# Patient Record
Sex: MALE | Race: BLACK OR AFRICAN AMERICAN | NOT HISPANIC OR LATINO | Employment: OTHER | URBAN - METROPOLITAN AREA
[De-identification: names, ages, dates, MRNs, and addresses within clinical notes are randomized per-mention and may not be internally consistent; named-entity substitution may affect disease eponyms.]

---

## 2017-01-25 DIAGNOSIS — I10 ESSENTIAL HYPERTENSION: ICD-10-CM

## 2017-01-25 RX ORDER — PRASUGREL HCL 10 MG
TABLET ORAL
Qty: 90 TABLET | Refills: 0 | Status: SHIPPED | OUTPATIENT
Start: 2017-01-25 | End: 2017-02-03 | Stop reason: SDUPTHER

## 2017-01-25 RX ORDER — AMLODIPINE BESYLATE 2.5 MG/1
TABLET ORAL
Qty: 180 TABLET | Refills: 0 | Status: SHIPPED | OUTPATIENT
Start: 2017-01-25 | End: 2017-03-16 | Stop reason: SDUPTHER

## 2017-01-25 RX ORDER — METOPROLOL TARTRATE 50 MG/1
TABLET, FILM COATED ORAL
Qty: 180 TABLET | Refills: 0 | Status: SHIPPED | OUTPATIENT
Start: 2017-01-25 | End: 2017-04-23 | Stop reason: SDUPTHER

## 2017-01-25 RX ORDER — LISINOPRIL AND HYDROCHLOROTHIAZIDE 25; 20 MG/1; MG/1
TABLET ORAL
Qty: 90 TABLET | Refills: 0 | Status: SHIPPED | OUTPATIENT
Start: 2017-01-25 | End: 2017-02-03 | Stop reason: SDUPTHER

## 2017-02-03 DIAGNOSIS — I10 ESSENTIAL HYPERTENSION: ICD-10-CM

## 2017-02-03 RX ORDER — PRASUGREL HCL 10 MG
TABLET ORAL
Qty: 90 TABLET | Refills: 0 | Status: SHIPPED | OUTPATIENT
Start: 2017-02-03 | End: 2017-06-24 | Stop reason: SDUPTHER

## 2017-02-03 RX ORDER — LISINOPRIL AND HYDROCHLOROTHIAZIDE 25; 20 MG/1; MG/1
TABLET ORAL
Qty: 90 TABLET | Refills: 0 | Status: SHIPPED | OUTPATIENT
Start: 2017-02-03 | End: 2017-06-24 | Stop reason: SDUPTHER

## 2017-02-23 ENCOUNTER — OFFICE VISIT (OUTPATIENT)
Dept: CARDIOLOGY | Facility: CLINIC | Age: 47
End: 2017-02-23

## 2017-02-23 ENCOUNTER — HOSPITAL ENCOUNTER (OUTPATIENT)
Dept: GENERAL RADIOLOGY | Facility: HOSPITAL | Age: 47
Discharge: HOME | End: 2017-02-23
Attending: INTERNAL MEDICINE | Admitting: INTERNAL MEDICINE

## 2017-02-23 ENCOUNTER — TELEPHONE (OUTPATIENT)
Dept: CARDIOLOGY | Facility: CLINIC | Age: 47
End: 2017-02-23

## 2017-02-23 VITALS
SYSTOLIC BLOOD PRESSURE: 110 MMHG | HEART RATE: 52 BPM | BODY MASS INDEX: 33.46 KG/M2 | HEIGHT: 67 IN | WEIGHT: 213.2 LBS | DIASTOLIC BLOOD PRESSURE: 74 MMHG

## 2017-02-23 DIAGNOSIS — R00.2 PALPITATIONS: ICD-10-CM

## 2017-02-23 DIAGNOSIS — I25.10 CORONARY ARTERY DISEASE INVOLVING NATIVE CORONARY ARTERY OF NATIVE HEART WITHOUT ANGINA PECTORIS: ICD-10-CM

## 2017-02-23 DIAGNOSIS — R06.09 DYSPNEA ON EXERTION: ICD-10-CM

## 2017-02-23 DIAGNOSIS — I10 ESSENTIAL HYPERTENSION: Primary | ICD-10-CM

## 2017-02-23 DIAGNOSIS — R07.2 PRECORDIAL PAIN: ICD-10-CM

## 2017-02-23 PROCEDURE — 99214 OFFICE O/P EST MOD 30 MIN: CPT | Performed by: INTERNAL MEDICINE

## 2017-02-23 PROCEDURE — 93000 ELECTROCARDIOGRAM COMPLETE: CPT | Performed by: INTERNAL MEDICINE

## 2017-02-23 PROCEDURE — 71020 HC CHEST PA AND LATERAL: CPT

## 2017-02-26 PROBLEM — R06.09 DYSPNEA ON EXERTION: Status: ACTIVE | Noted: 2017-02-26

## 2017-02-26 PROBLEM — R00.2 PALPITATIONS: Status: ACTIVE | Noted: 2017-02-26

## 2017-03-09 ENCOUNTER — HOSPITAL ENCOUNTER (OUTPATIENT)
Dept: CARDIOLOGY | Facility: HOSPITAL | Age: 47
Discharge: HOME | End: 2017-03-09
Attending: INTERNAL MEDICINE | Admitting: INTERNAL MEDICINE

## 2017-03-09 VITALS
DIASTOLIC BLOOD PRESSURE: 88 MMHG | HEIGHT: 67 IN | WEIGHT: 213 LBS | HEART RATE: 89 BPM | BODY MASS INDEX: 33.43 KG/M2 | SYSTOLIC BLOOD PRESSURE: 128 MMHG

## 2017-03-09 DIAGNOSIS — R06.09 DYSPNEA ON EXERTION: ICD-10-CM

## 2017-03-09 DIAGNOSIS — R00.2 PALPITATIONS: ICD-10-CM

## 2017-03-09 DIAGNOSIS — R07.2 PRECORDIAL PAIN: ICD-10-CM

## 2017-03-09 LAB
BH CV ECHO MEAS - ACS: 2.1 CM
BH CV ECHO MEAS - AO MAX PG: 4.5 MMHG
BH CV ECHO MEAS - AO ROOT AREA (BSA CORRECTED): 1.6
BH CV ECHO MEAS - AO ROOT AREA: 8.3 CM^2
BH CV ECHO MEAS - AO ROOT DIAM: 3.2 CM
BH CV ECHO MEAS - AO V2 MAX: 105.7 CM/SEC
BH CV ECHO MEAS - BSA(HAYCOCK): 2.2 M^2
BH CV ECHO MEAS - BSA: 2.1 M^2
BH CV ECHO MEAS - BZI_BMI: 33.4 KILOGRAMS/M^2
BH CV ECHO MEAS - BZI_METRIC_HEIGHT: 170.2 CM
BH CV ECHO MEAS - BZI_METRIC_WEIGHT: 96.6 KG
BH CV ECHO MEAS - CONTRAST EF 4CH: 72.6 ML/M^2
BH CV ECHO MEAS - EDV(MOD-SP4): 73 ML
BH CV ECHO MEAS - EDV(TEICH): 138.1 ML
BH CV ECHO MEAS - EF(CUBED): 77.1 %
BH CV ECHO MEAS - EF(MOD-SP4): 60 %
BH CV ECHO MEAS - EF(TEICH): 68.7 %
BH CV ECHO MEAS - ESV(MOD-SP4): 20 ML
BH CV ECHO MEAS - ESV(TEICH): 43.3 ML
BH CV ECHO MEAS - FS: 38.8 %
BH CV ECHO MEAS - IVS/LVPW: 1
BH CV ECHO MEAS - IVSD: 1.2 CM
BH CV ECHO MEAS - LAT PEAK E' VEL: 10 CM/SEC
BH CV ECHO MEAS - LV DIASTOLIC VOL/BSA (35-75): 35.1 ML/M^2
BH CV ECHO MEAS - LV MASS(C)D: 246.1 GRAMS
BH CV ECHO MEAS - LV MASS(C)DI: 118.5 GRAMS/M^2
BH CV ECHO MEAS - LV SYSTOLIC VOL/BSA (12-30): 9.6 ML/M^2
BH CV ECHO MEAS - LVIDD: 5.3 CM
BH CV ECHO MEAS - LVIDS: 3.3 CM
BH CV ECHO MEAS - LVLD AP4: 6.8 CM
BH CV ECHO MEAS - LVLS AP4: 5.7 CM
BH CV ECHO MEAS - LVPWD: 1.2 CM
BH CV ECHO MEAS - MED PEAK E' VEL: 1 CM/SEC
BH CV ECHO MEAS - MV A DUR: 0.08 SEC
BH CV ECHO MEAS - MV A MAX VEL: 60.6 CM/SEC
BH CV ECHO MEAS - MV DEC SLOPE: 405.2 CM/SEC^2
BH CV ECHO MEAS - MV DEC TIME: 0.18 SEC
BH CV ECHO MEAS - MV E MAX VEL: 74.2 CM/SEC
BH CV ECHO MEAS - MV E/A: 1.2
BH CV ECHO MEAS - MV P1/2T MAX VEL: 75.2 CM/SEC
BH CV ECHO MEAS - MV P1/2T: 54.3 MSEC
BH CV ECHO MEAS - MVA P1/2T LCG: 2.9 CM^2
BH CV ECHO MEAS - MVA(P1/2T): 4 CM^2
BH CV ECHO MEAS - PULM A REVS DUR: 106 SEC
BH CV ECHO MEAS - PULM A REVS VEL: 27.7 CM/SEC
BH CV ECHO MEAS - PULM DIAS VEL: 39.1 CM/SEC
BH CV ECHO MEAS - PULM S/D: 1.5
BH CV ECHO MEAS - PULM SYS VEL: 56.9 CM/SEC
BH CV ECHO MEAS - SI(CUBED): 56.7 ML/M^2
BH CV ECHO MEAS - SI(MOD-SP4): 25.5 ML/M^2
BH CV ECHO MEAS - SI(TEICH): 45.7 ML/M^2
BH CV ECHO MEAS - SV(CUBED): 117.8 ML
BH CV ECHO MEAS - SV(MOD-SP4): 53 ML
BH CV ECHO MEAS - SV(TEICH): 94.9 ML
BH CV ECHO MEAS - TAPSE (>1.6): 2.4 CM2
BH CV STRESS BP STAGE 1: NORMAL
BH CV STRESS BP STAGE 2: NORMAL
BH CV STRESS BP STAGE 3: NORMAL
BH CV STRESS DURATION MIN STAGE 1: 3
BH CV STRESS DURATION MIN STAGE 2: 3
BH CV STRESS DURATION MIN STAGE 3: 3
BH CV STRESS DURATION MIN STAGE 4: 0
BH CV STRESS DURATION SEC STAGE 1: 0
BH CV STRESS DURATION SEC STAGE 2: 0
BH CV STRESS DURATION SEC STAGE 3: 0
BH CV STRESS DURATION SEC STAGE 4: 30
BH CV STRESS ECHO POST STRESS EJECTION FRACTION EF: 73 %
BH CV STRESS GRADE STAGE 1: 10
BH CV STRESS GRADE STAGE 2: 12
BH CV STRESS GRADE STAGE 3: 14
BH CV STRESS GRADE STAGE 4: 16
BH CV STRESS HR STAGE 1: 120
BH CV STRESS HR STAGE 2: 130
BH CV STRESS HR STAGE 3: 148
BH CV STRESS HR STAGE 4: 150
BH CV STRESS METS STAGE 1: 5
BH CV STRESS METS STAGE 2: 7.5
BH CV STRESS METS STAGE 3: 10
BH CV STRESS METS STAGE 4: 13.5
BH CV STRESS PROTOCOL 1: NORMAL
BH CV STRESS SPEED STAGE 1: 1.7
BH CV STRESS SPEED STAGE 2: 2.5
BH CV STRESS SPEED STAGE 3: 3.4
BH CV STRESS SPEED STAGE 4: 4.2
BH CV STRESS STAGE 1: 1
BH CV STRESS STAGE 2: 2
BH CV STRESS STAGE 3: 3
BH CV STRESS STAGE 4: 4
BH CV XLRA - RV BASE: 2.8 CM
BH CV XLRA - TDI S': 10 CM/SEC
E/E' RATIO: 8
LEFT ATRIUM VOLUME INDEX: 15 ML/M2
MAXIMAL PREDICTED HEART RATE: 172 BPM
PERCENT MAX PREDICTED HR: 87.21 %
STRESS BASELINE BP: NORMAL MMHG
STRESS BASELINE HR: 89 BPM
STRESS PERCENT HR: 103 %
STRESS POST ESTIMATED WORKLOAD: 13 METS
STRESS POST EXERCISE DUR MIN: 9 MIN
STRESS POST EXERCISE DUR SEC: 30 SEC
STRESS POST PEAK BP: NORMAL MMHG
STRESS POST PEAK HR: 150 BPM
STRESS TARGET HR: 146 BPM

## 2017-03-09 PROCEDURE — 93350 STRESS TTE ONLY: CPT

## 2017-03-09 PROCEDURE — 93320 DOPPLER ECHO COMPLETE: CPT | Performed by: INTERNAL MEDICINE

## 2017-03-09 PROCEDURE — 93018 CV STRESS TEST I&R ONLY: CPT | Performed by: INTERNAL MEDICINE

## 2017-03-09 PROCEDURE — 93017 CV STRESS TEST TRACING ONLY: CPT

## 2017-03-09 PROCEDURE — 93350 STRESS TTE ONLY: CPT | Performed by: INTERNAL MEDICINE

## 2017-03-09 PROCEDURE — 93320 DOPPLER ECHO COMPLETE: CPT

## 2017-03-09 PROCEDURE — 93016 CV STRESS TEST SUPVJ ONLY: CPT | Performed by: INTERNAL MEDICINE

## 2017-03-09 PROCEDURE — 93325 DOPPLER ECHO COLOR FLOW MAPG: CPT

## 2017-03-09 PROCEDURE — 93325 DOPPLER ECHO COLOR FLOW MAPG: CPT | Performed by: INTERNAL MEDICINE

## 2017-03-15 ENCOUNTER — TELEPHONE (OUTPATIENT)
Dept: CARDIOLOGY | Facility: CLINIC | Age: 47
End: 2017-03-15

## 2017-03-15 NOTE — TELEPHONE ENCOUNTER
I called patient at listed number to go over stress test and answer questions.  Got voicemail.  Left message to call back. aye

## 2017-03-16 DIAGNOSIS — I10 ESSENTIAL HYPERTENSION: ICD-10-CM

## 2017-03-16 RX ORDER — AMLODIPINE BESYLATE 2.5 MG/1
2.5 TABLET ORAL DAILY
Qty: 90 TABLET | Refills: 1 | Status: SHIPPED | OUTPATIENT
Start: 2017-03-16 | End: 2017-04-23 | Stop reason: SDUPTHER

## 2017-03-16 NOTE — TELEPHONE ENCOUNTER
I talked to patient regarding test results.  He is on a CPAP for the past 6-8 weeks.  There was some mention of possibly adjusting in the future by the sleep apnea physician  He is having nocturnal palpitations to 3 times a week that has awoken him from sleep and last a few seconds.  He will contact the sleep physicians regarding further titration of his CPAP.  If his symptoms persist despite this over this no further adjustments to be made, will place a 0 patch.      In addition patient states his had some urinary incontinence and there has been prescribed Flomax 0.4 mg at night.  I told him this is okay to start however will hold the p.m. dose of amlodipine for now.  He will follow his blood pressures and call if it is elevated. aye

## 2017-03-17 ENCOUNTER — TELEPHONE (OUTPATIENT)
Dept: CARDIOLOGY | Facility: CLINIC | Age: 47
End: 2017-03-17

## 2017-03-17 NOTE — TELEPHONE ENCOUNTER
Salima from [facility] in [facility] needs a faxed statement  stating pt is able to return to [facility] next week.   Sharon call back # [phone] /fax# [phone]   Thanks Iam suarez

## 2017-04-23 DIAGNOSIS — I10 ESSENTIAL HYPERTENSION: ICD-10-CM

## 2017-04-24 RX ORDER — METOPROLOL TARTRATE 50 MG/1
TABLET, FILM COATED ORAL
Qty: 180 TABLET | Refills: 2 | Status: SHIPPED | OUTPATIENT
Start: 2017-04-24 | End: 2019-08-16 | Stop reason: SDUPTHER

## 2017-04-24 RX ORDER — AMLODIPINE BESYLATE 2.5 MG/1
TABLET ORAL
Qty: 180 TABLET | Refills: 2 | Status: SHIPPED | OUTPATIENT
Start: 2017-04-24 | End: 2020-08-14 | Stop reason: SDUPTHER

## 2017-06-24 DIAGNOSIS — I10 ESSENTIAL HYPERTENSION: ICD-10-CM

## 2017-06-27 RX ORDER — LISINOPRIL AND HYDROCHLOROTHIAZIDE 25; 20 MG/1; MG/1
TABLET ORAL
Qty: 90 TABLET | Refills: 1 | Status: SHIPPED | OUTPATIENT
Start: 2017-06-27 | End: 2020-08-14 | Stop reason: SDUPTHER

## 2017-06-27 RX ORDER — PRASUGREL HCL 10 MG
TABLET ORAL
Qty: 90 TABLET | Refills: 1 | Status: SHIPPED | OUTPATIENT
Start: 2017-06-27 | End: 2018-01-15

## 2017-07-28 RX ORDER — ATORVASTATIN CALCIUM 80 MG/1
TABLET, FILM COATED ORAL
Qty: 90 TABLET | Refills: 2 | OUTPATIENT
Start: 2017-07-28

## 2017-08-24 ENCOUNTER — OFFICE VISIT (OUTPATIENT)
Dept: CARDIOLOGY | Facility: CLINIC | Age: 47
End: 2017-08-24

## 2017-08-24 VITALS
WEIGHT: 227 LBS | DIASTOLIC BLOOD PRESSURE: 90 MMHG | HEIGHT: 67 IN | SYSTOLIC BLOOD PRESSURE: 142 MMHG | BODY MASS INDEX: 35.63 KG/M2 | HEART RATE: 56 BPM

## 2017-08-24 DIAGNOSIS — I10 ESSENTIAL HYPERTENSION: ICD-10-CM

## 2017-08-24 DIAGNOSIS — E78.2 MIXED HYPERLIPIDEMIA: ICD-10-CM

## 2017-08-24 DIAGNOSIS — R07.2 PRECORDIAL PAIN: ICD-10-CM

## 2017-08-24 DIAGNOSIS — I25.10 CORONARY ARTERY DISEASE INVOLVING NATIVE CORONARY ARTERY OF NATIVE HEART WITHOUT ANGINA PECTORIS: Primary | ICD-10-CM

## 2017-08-24 DIAGNOSIS — R00.2 PALPITATIONS: ICD-10-CM

## 2017-08-24 PROCEDURE — 99214 OFFICE O/P EST MOD 30 MIN: CPT | Performed by: INTERNAL MEDICINE

## 2017-08-24 PROCEDURE — 93000 ELECTROCARDIOGRAM COMPLETE: CPT | Performed by: INTERNAL MEDICINE

## 2017-08-24 RX ORDER — SODIUM PHOSPHATE,MONO-DIBASIC 19G-7G/118
1 ENEMA (ML) RECTAL 2 TIMES DAILY WITH MEALS
COMMUNITY
End: 2020-01-31

## 2017-08-24 RX ORDER — CHLORAL HYDRATE 500 MG
1000 CAPSULE ORAL
COMMUNITY
End: 2021-07-15 | Stop reason: SDUPTHER

## 2017-08-24 NOTE — PROGRESS NOTES
Date of Office Visit: 2017  Encounter Provider: Christine Carson MD  Place of Service: [facility] CARDIOLOG[facility]  Patient Name: Matthew Morris  :1970    Chief complaint  Followup of  hypertension, coronary artery disease and evaluation of chest pain, palpitations    History of Present Illness   Patient is a very pleasant 47-year-old gentleman who was transferred from [facility] on  after chest pain with an elevated troponin consistent with non-ST elevation microinfarction.  Cardiogram revealed an ejection fraction 40% and a catheterization revealed severe disease of the right coronary artery.  He received a drug-coated stent.  He has had intermittent chest pain since then in the setting of hypertension.  He was admitted on  for such an occurrence he ruled out for myocardial infarction.  An echocardiogram revealed his ejection fraction had improved to 50% with mild to moderate mitral regurgitation.  In 2017 he had a stress echocardiogram that revealed no evidence of ischemia at a good workload with normal left ventricular systolic function with mild left ventricular hypertrophy and no significant valvar dysfunction  .  Since last visit blood pressure home has been occasional elevated with stress but most often is in the 130s over 80s range.  He has had more stresses with a loss of his sister who was in her 50s to stroke as well as 2 uncles.  He is to start back walking 3 miles and 45 minutes 3 weeks ago and denies any exertional symptoms.  However after eating only first lays down he will notice palpitations.  He has had chest discomfort when after eating as well.  He does not occur with exertion      Past Medical History:   Diagnosis Date   • Angina pectoris    • Aortic calcification    • Arthritis    • CAD (coronary artery disease)     NSTEMI 2016: cath with 99% RCA s/p 3.5x12mm WENDIE, 30% ostial LCx, normal LM/LAD   • Cervical radiculopathy    •  Chest pain    • Coronary artery disease involving native coronary artery of native heart without angina pectoris    • HERNÁNDEZ (dyspnea on exertion)    • Essential hypertension    • Headache    • Hyperlipidemia    • Hypertension    • Joint pain    • Joint swelling    • Knee pain    • Leg swelling    • Malaise and fatigue    • Moderate mitral regurgitation     by echo 4/2016   • MR (mitral regurgitation)    • Neck pain    • Palpitations    • Pinched nerve in neck 2016   • Precordial pain    • SOB (shortness of breath)      Past Surgical History:   Procedure Laterality Date   • CARDIAC CATHETERIZATION Left 4/28/2016    Procedure: Cardiac catheterization;  Surgeon: Linda Slaughter MD;  Location: [facility] CATH INVASIVE LOCATION;  Service:    • CARDIAC CATHETERIZATION N/A 4/28/2016    Procedure: Left ventriculography;  Surgeon: Linda Slaughter MD;  Location: [facility] CATH INVASIVE LOCATION;  Service:    • CORONARY ANGIOPLASTY WITH STENT PLACEMENT     • CYST REMOVAL  1997    right testicle, benign   • HERNIA REPAIR     • HYDROCELE EXCISION / REPAIR  1997     Outpatient Medications Prior to Visit   Medication Sig Dispense Refill   • amLODIPine (NORVASC) 2.5 MG tablet TAKE 1 TABLET EVERY 12 HOURS 180 tablet 2   • aspirin 81 MG EC tablet Take 81 mg by mouth daily.     • atorvastatin (LIPITOR) 80 MG tablet Take 1 tablet by mouth daily. 90 tablet 3   • EFFIENT 10 MG tablet TAKE 1 TABLET DAILY 90 tablet 1   • gabapentin (NEURONTIN) 600 MG tablet Take 600 mg by mouth 3 (three) times a day     • lisinopril-hydrochlorothiazide (PRINZIDE,ZESTORETIC) 20-25 MG per tablet TAKE 1 TABLET DAILY 90 tablet 1   • metoprolol tartrate (LOPRESSOR) 50 MG tablet TAKE 1 TABLET TWICE A  tablet 2     No facility-administered medications prior to visit.      Allergies as of 08/24/2017   • (No Known Allergies)     Social History     Social History   • Marital status: Single     Spouse name: N/A   • Number of children: N/A   • Years of education: N/A  "    Occupational History   •   [employer]     Social History Main Topics   • Smoking status: Never Smoker   • Smokeless tobacco: Not on file   • Alcohol use 0.0 oz/week     0 Glasses of wine per week      Comment: occ   • Drug use: No   • Sexual activity: Defer     Other Topics Concern   • Not on file     Social History Narrative     Family History   Problem Relation Age of Onset   • Heart disease Mother    • Heart disease Father      Review of Systems   Constitution: Positive for malaise/fatigue. Negative for fever, weight gain and weight loss.   HENT: Positive for headaches. Negative for ear pain, hearing loss, nosebleeds and sore throat.    Eyes: Negative for double vision, pain, vision loss in left eye and vision loss in right eye.   Cardiovascular: Positive for palpitations.        See history of present illness.   Respiratory: Negative for cough, shortness of breath, sleep disturbances due to breathing, snoring and wheezing.    Endocrine: Negative for cold intolerance, heat intolerance and polyuria.   Skin: Negative for itching, poor wound healing and rash.   Musculoskeletal: Negative for joint pain, joint swelling and myalgias.   Gastrointestinal: Negative for abdominal pain, diarrhea, hematochezia, nausea and vomiting.   Genitourinary: Negative for hematuria and hesitancy.   Neurological: Negative for numbness, paresthesias and seizures.   Psychiatric/Behavioral: Negative for depression. The patient is not nervous/anxious.      Objective:     Vitals:    08/24/17 1009   BP: 142/90   Pulse: 56   Weight: 227 lb (103 kg)   Height: 67\" (170.2 cm)     Body mass index is 35.55 kg/(m^2).    Physical Exam   Constitutional: He is oriented to person, place, and time. He appears well-developed and well-nourished.   Obese   HENT:   Head: Normocephalic.   Nose: Nose normal.   Mouth/Throat: Oropharynx is clear and moist.   Eyes: Conjunctivae and EOM are normal. Pupils are equal, round, and reactive to light. Right eye exhibits " no discharge. No scleral icterus.   Neck: Normal range of motion. Neck supple. No JVD present. No thyromegaly present.   Cardiovascular: Normal rate, regular rhythm, normal heart sounds and intact distal pulses.  Exam reveals no gallop and no friction rub.    No murmur heard.  Pulses:       Carotid pulses are 2+ on the right side, and 2+ on the left side.       Radial pulses are 2+ on the right side, and 2+ on the left side.        Femoral pulses are 2+ on the right side, and 2+ on the left side.       Popliteal pulses are 2+ on the right side, and 2+ on the left side.        Dorsalis pedis pulses are 2+ on the right side, and 2+ on the left side.        Posterior tibial pulses are 2+ on the right side, and 2+ on the left side.   Pulmonary/Chest: Effort normal and breath sounds normal. No respiratory distress. He has no wheezes. He has no rales.   Abdominal: Soft. Bowel sounds are normal. He exhibits no distension. There is no hepatosplenomegaly. There is no tenderness. There is no rebound.   Musculoskeletal: Normal range of motion. He exhibits no edema or tenderness.   Neurological: He is alert and oriented to person, place, and time.   Skin: Skin is warm and dry. No rash noted. No erythema.   Psychiatric: He has a normal mood and affect. His behavior is normal. Judgment and thought content normal.   Vitals reviewed.    Lab Review:     ECG 12 Lead  Date/Time: 8/24/2017 10:12 AM  Performed by: PACO GOMEZ  Authorized by: PACO GOMEZ   Comparison: compared with previous ECG   Similar to previous ECG  Rhythm: sinus rhythm  Conduction comments: Nonspecific ST T wave changes  QTc = 394msec  Clinical impression: abnormal ECG          Assessment:       Diagnosis Plan   1. Coronary artery disease involving native coronary artery of native heart without angina pectoris     2. Palpitations  ZIO Patch   3. Essential hypertension     4. Mixed hyperlipidemia     5. Precordial pain       Plan:       1   Episodes of palpitations.   We'll place a 0 patch.  2.  Coronary artery disease, non-ST elevation myocardial infarction, status post drug-coated stent placement.  No anginal symptoms at this time with a negative stress test earlier this year  3.  Hypertension, observe for now on his current regimen as he is just started back exercising.  May need further titration if it remains elevated  4.  Mitral regurgitation, now mild  5.  Hyperlipidemia, told was acceptable on recent labs  6.  Chest pain.  This sounds more gastric in etiology will discuss further with Dr. Ruiz.  7.  Obstructive sleep apnea.  He is using his CPAP consistently though has gained 14 pounds.  He will follow-up with his sleep physicians as he may need further titration of this as well    Coronary Artery Disease  Assessment  • The patient has no angina    Plan  • Lifestyle modifications discussed include adhering to a heart healthy diet, maintenance of a healthy weight, regular exercise and regular monitoring of cholesterol and blood pressure    Subjective - Objective  • There is a history of past MI  • There has been a previous stent procedure using WENDIE  • Current antiplatelet therapy includes aspirin 81 mg       Matthew Morris   Home Medication Instructions AARON:    Printed on:08/27/17 1015   Medication Information                      amLODIPine (NORVASC) 2.5 MG tablet  TAKE 1 TABLET EVERY 12 HOURS             aspirin 81 MG EC tablet  Take 81 mg by mouth daily.             atorvastatin (LIPITOR) 80 MG tablet  Take 1 tablet by mouth daily.             EFFIENT 10 MG tablet  TAKE 1 TABLET DAILY             gabapentin (NEURONTIN) 600 MG tablet  Take 600 mg by mouth 3 (three) times a day             glucosamine-chondroitin 500-400 MG capsule capsule  Take 1 capsule by mouth 3 (Three) Times a Day With Meals.             lisinopril-hydrochlorothiazide (PRINZIDE,ZESTORETIC) 20-25 MG per tablet  TAKE 1 TABLET DAILY             metoprolol tartrate (LOPRESSOR) 50 MG tablet  TAKE 1  TABLET TWICE A DAY             Omega-3 Fatty Acids (FISH OIL) 1000 MG capsule capsule  Take 1,000 mg by mouth Daily With Breakfast.             VOLTAREN 1 % gel gel  As Needed.                 Dictated utilizing Dragon dictation

## 2018-01-11 ENCOUNTER — TELEPHONE (OUTPATIENT)
Dept: CARDIOLOGY | Facility: CLINIC | Age: 48
End: 2018-01-11

## 2018-01-11 NOTE — TELEPHONE ENCOUNTER
----- Message from Iam Mancera MA sent at 1/11/2018  8:56 AM EST -----  Regarding: FW: Prescription Question  Contact: [phone]      ----- Message -----     From: Sen Morris     Sent: 1/9/2018  10:19 AM       To: Kristin Beauchamp [facility]  Subject: Prescription Question                            good morning,  Dr GILLILAND and team,    Is there any medcine alternative to effient that can be prescribed for me.  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     just looking for options as i move forward. i have a 60 supply, but want to inform you.     thanks in advance  sen

## 2018-01-15 RX ORDER — CLOPIDOGREL BISULFATE 75 MG/1
75 TABLET ORAL DAILY
Qty: 90 TABLET | Refills: 1 | Status: SHIPPED | OUTPATIENT
Start: 2018-01-15 | End: 2019-01-08 | Stop reason: SDUPTHER

## 2018-01-15 NOTE — TELEPHONE ENCOUNTER
Info to pt.  No reaction to the Plavix.  Rx sent to [facility].  Pt has about 60 days of Effient left before he switches.  (done)

## 2018-04-12 ENCOUNTER — OFFICE VISIT (OUTPATIENT)
Dept: CARDIOLOGY | Facility: CLINIC | Age: 48
End: 2018-04-12

## 2018-04-12 VITALS
BODY MASS INDEX: 36.41 KG/M2 | SYSTOLIC BLOOD PRESSURE: 130 MMHG | DIASTOLIC BLOOD PRESSURE: 92 MMHG | HEART RATE: 58 BPM | WEIGHT: 232 LBS | HEIGHT: 67 IN

## 2018-04-12 DIAGNOSIS — I25.10 CORONARY ARTERY DISEASE INVOLVING NATIVE CORONARY ARTERY OF NATIVE HEART WITHOUT ANGINA PECTORIS: ICD-10-CM

## 2018-04-12 DIAGNOSIS — R00.2 PALPITATIONS: ICD-10-CM

## 2018-04-12 DIAGNOSIS — E78.2 MIXED HYPERLIPIDEMIA: ICD-10-CM

## 2018-04-12 DIAGNOSIS — I10 ESSENTIAL HYPERTENSION: Primary | ICD-10-CM

## 2018-04-12 PROCEDURE — 93000 ELECTROCARDIOGRAM COMPLETE: CPT | Performed by: NURSE PRACTITIONER

## 2018-04-12 PROCEDURE — 99214 OFFICE O/P EST MOD 30 MIN: CPT | Performed by: NURSE PRACTITIONER

## 2018-04-12 NOTE — PROGRESS NOTES
Date of Office Visit: 2018  Encounter Provider: CELI Campo  Place of Service: [facility] CARDIOLOGY  Patient Name: Matthew Morris  :1970    No chief complaint on file.  : chest pain    HPI: Matthew Morris is a 47 y.o. male comes in today for Six-month followup. He is a patient of Dr. Carson. I am seeing him for the 1st time today and have reviewed his records. He has a history of coronary disease, palpitations, hypertension, mild mitral regurgitation, hyperlipidemia and sleep apnea.     In 2016, he was transferred from [facility] with chest pain and an elevated troponin consistent with a non-ST elevation MI. His echo showed an EF of 40%. On cardiac cath he was found to have severe disease of the RCA. A drug-eluting stent was placed. He had intermittent chest pain in the setting of hypertension. A repeat echo in 2016 showed an EF of 50% with mild-to-moderate MR.     In 2017, he had a stress echo revealing no evidence of ischemia and good workload with normal LV systolic function with mild LVH and no significant valvular disease.    Palpitations that last 5-10seconds with a sharp pain. Has had only 3 in last 6 months. Otherwise feeling well. Denies palpitations or tachycardia, shortness of breath, edema, dizziness, chest pain, fatigue, orthopnea or PND. Exercises regularly. He will get lab work    Past Medical History:   Diagnosis Date   • Angina pectoris    • Aortic calcification    • Arthritis    • CAD (coronary artery disease)     NSTEMI 2016: cath with 99% RCA s/p 3.5x12mm WENDIE, 30% ostial LCx, normal LM/LAD   • Cervical radiculopathy    • Chest pain    • Coronary artery disease involving native coronary artery of native heart without angina pectoris    • HERNÁNDEZ (dyspnea on exertion)    • Essential hypertension    • Headache    • Hyperlipidemia    • Hypertension    • Joint pain    • Joint swelling    • Knee pain    • Leg swelling    •  Malaise and fatigue    • Moderate mitral regurgitation     by echo 4/2016   • MR (mitral regurgitation)    • Neck pain    • Palpitations    • Pinched nerve in neck 2016   • Precordial pain    • SOB (shortness of breath)        Past Surgical History:   Procedure Laterality Date   • CARDIAC CATHETERIZATION Left 4/28/2016    Procedure: Cardiac catheterization;  Surgeon: Linda Slaughter MD;  Location:  [facility] CATH INVASIVE LOCATION;  Service:    • CARDIAC CATHETERIZATION N/A 4/28/2016    Procedure: Left ventriculography;  Surgeon: Linda Slaughter MD;  Location: [facility] CATH INVASIVE LOCATION;  Service:    • CORONARY ANGIOPLASTY WITH STENT PLACEMENT     • CYST REMOVAL  1997    right testicle, benign   • HERNIA REPAIR     • HYDROCELE EXCISION / REPAIR  1997           Review of Systems   Constitution: Positive for malaise/fatigue. Negative for fever, weight gain and weight loss.   HENT: Negative for ear pain, hearing loss, nosebleeds and sore throat.    Eyes: Negative for double vision, pain, vision loss in left eye and vision loss in right eye.   Cardiovascular: Positive for palpitations.        See history of present illness.   Respiratory: Negative for cough, shortness of breath, sleep disturbances due to breathing, snoring and wheezing.    Endocrine: Negative for cold intolerance, heat intolerance and polyuria.   Skin: Negative for itching, poor wound healing and rash.   Musculoskeletal: Negative for joint pain, joint swelling and myalgias.   Gastrointestinal: Negative for abdominal pain, diarrhea, hematochezia, nausea and vomiting.   Genitourinary: Negative for hematuria and hesitancy.   Neurological: Positive for headaches. Negative for numbness, paresthesias and seizures.   Psychiatric/Behavioral: Negative for depression. The patient is not nervous/anxious.      All other systems reviewed and are negative    No Known Allergies    All aspects of family and social history reviewed.           Objective:     Vitals:     "04/12/18 1524   BP: 130/92   BP Location: Left arm   Pulse: 58   Weight: 105 kg (232 lb)   Height: 170.2 cm (67\")     Body mass index is 36.34 kg/m².    PHYSICAL EXAM:  Physical Exam   Constitutional: He is oriented to person, place, and time. He appears well-developed and well-nourished.   Obese   HENT:   Head: Normocephalic.   Nose: Nose normal.   Mouth/Throat: Oropharynx is clear and moist.   Eyes: Conjunctivae and EOM are normal. Pupils are equal, round, and reactive to light. Right eye exhibits no discharge. No scleral icterus.   Neck: Normal range of motion. Neck supple. No JVD present. No thyromegaly present.   Cardiovascular: Normal rate, regular rhythm, normal heart sounds and intact distal pulses.  Exam reveals no gallop and no friction rub.    No murmur heard.  Pulses:       Carotid pulses are 2+ on the right side, and 2+ on the left side.       Radial pulses are 2+ on the right side, and 2+ on the left side.        Femoral pulses are 2+ on the right side, and 2+ on the left side.       Popliteal pulses are 2+ on the right side, and 2+ on the left side.        Dorsalis pedis pulses are 2+ on the right side, and 2+ on the left side.        Posterior tibial pulses are 2+ on the right side, and 2+ on the left side.   Pulmonary/Chest: Effort normal and breath sounds normal. No respiratory distress. He has no wheezes. He has no rales.   Abdominal: Soft. Bowel sounds are normal. He exhibits no distension. There is no hepatosplenomegaly. There is no tenderness. There is no rebound.   Musculoskeletal: Normal range of motion. He exhibits no edema or tenderness.   Neurological: He is alert and oriented to person, place, and time.   Skin: Skin is warm and dry. No rash noted. No erythema.   Psychiatric: He has a normal mood and affect. His behavior is normal. Judgment and thought content normal.   Vitals reviewed.        ECG 12 Lead  Date/Time: 4/12/2018 8:29 AM  Performed by: MATTHEW CASTANO  Authorized by: " MATTHEW CASTANO   Comparison: compared with previous ECG from 8/24/2017  Rhythm: sinus rhythm  Rate: normal  BPM: 58  Conduction: conduction normal  ST Segments: ST segments normal  T Waves: T waves normal  QRS axis: normal  Other: no other findings  Clinical impression: normal ECG  Comments: Indication: palpitations                Assessment:       Diagnosis Plan   1. Essential hypertension     2. Coronary artery disease involving native coronary artery of native heart without angina pectoris     3. Mixed hyperlipidemia     4. Palpitations          Orders Placed This Encounter   Procedures   • ECG 12 Lead     This order was created via procedure documentation       Current Outpatient Prescriptions   Medication Sig Dispense Refill   • amLODIPine (NORVASC) 2.5 MG tablet TAKE 1 TABLET EVERY 12 HOURS 180 tablet 2   • aspirin 81 MG EC tablet Take 81 mg by mouth daily.     • atorvastatin (LIPITOR) 80 MG tablet Take 1 tablet by mouth daily. 90 tablet 3   • clopidogrel (PLAVIX) 75 MG tablet Take 1 tablet by mouth Daily. 90 tablet 1   • gabapentin (NEURONTIN) 600 MG tablet Take 600 mg by mouth 3 (three) times a day     • glucosamine-chondroitin 500-400 MG capsule capsule Take 1 capsule by mouth 2 (Two) Times a Day With Meals.     • lisinopril-hydrochlorothiazide (PRINZIDE,ZESTORETIC) 20-25 MG per tablet TAKE 1 TABLET DAILY 90 tablet 1   • metoprolol tartrate (LOPRESSOR) 50 MG tablet TAKE 1 TABLET TWICE A  tablet 2   • Omega-3 Fatty Acids (FISH OIL) 1000 MG capsule capsule Take 1,000 mg by mouth Daily With Breakfast.     • VOLTAREN 1 % gel gel As Needed.       No current facility-administered medications for this visit.             Plan:       1. Coronary coronary disease-drug-eluting stent to the RCA in 2016.  Non-ST elevation MI.  EF reduced to 40% at time of event.  Last echo showed an EF of 60%.    Coronary Artery Disease  Assessment  • The patient has no angina    Plan  • Lifestyle modifications discussed  include adhering to a heart healthy diet, maintenance of a healthy weight, regular exercise and regular monitoring of cholesterol and blood pressure    Subjective - Objective  • There is a history of past MI  • There has been a previous stent procedure using WENDIE  • Current antiplatelet therapy includes aspirin 81 mg      Denies angina      2.  Palpitations-patient was having palpitations that last visit.  Normal Zio patch. Last 5 seconds at the most. Infrequent. Continue to monitor    3.  Hypertension-stable    4.  Hyperlipidemia-on atorvastatin    5.  Sleep apnea      No changes. Lab work to be faxed from [place]. Lipid panel. Not sure if has been drawn in last 6 months    Follow up in office in 1 year.     As always, it has been a pleasure to participate in this patient's care.      Sincerely,      Dinora Thomas, APRN

## 2019-01-03 ENCOUNTER — OFFICE VISIT (OUTPATIENT)
Dept: CARDIOLOGY | Facility: CLINIC | Age: 49
End: 2019-01-03

## 2019-01-03 VITALS
BODY MASS INDEX: 36.22 KG/M2 | SYSTOLIC BLOOD PRESSURE: 130 MMHG | HEART RATE: 55 BPM | WEIGHT: 230.8 LBS | HEIGHT: 67 IN | DIASTOLIC BLOOD PRESSURE: 80 MMHG

## 2019-01-03 DIAGNOSIS — R07.2 PRECORDIAL PAIN: Primary | ICD-10-CM

## 2019-01-03 DIAGNOSIS — I10 ESSENTIAL HYPERTENSION: ICD-10-CM

## 2019-01-03 DIAGNOSIS — I25.10 CORONARY ARTERY DISEASE INVOLVING NATIVE CORONARY ARTERY OF NATIVE HEART WITHOUT ANGINA PECTORIS: ICD-10-CM

## 2019-01-03 PROCEDURE — 99213 OFFICE O/P EST LOW 20 MIN: CPT | Performed by: INTERNAL MEDICINE

## 2019-01-03 PROCEDURE — 93000 ELECTROCARDIOGRAM COMPLETE: CPT | Performed by: INTERNAL MEDICINE

## 2019-01-03 NOTE — PROGRESS NOTES
Date of Office Visit: 2019  Encounter Provider: Christine Carson MD  Place of Service: [facility] CARDIOLOGY  Patient Name: Matthew Morris  :1970    Chief complaint  Followup of  hypertension, coronary artery disease and evaluation of chest pain, palpitations    History of Present Illness   Patient is a very pleasant 48-year-old gentleman who was transferred from [facility] on 2016 after chest pain with an elevated troponin consistent with non-ST elevation myocardial infarction.  Cardiogram revealed an ejection fraction 40% and a catheterization revealed severe disease of the right coronary artery.  He received a drug-coated stent.  He has had intermittent chest pain since then in the setting of hypertension.  He was admitted on  for such an occurrence he ruled out for myocardial infarction.  An echocardiogram revealed his ejection fraction had improved to 50% with mild to moderate mitral regurgitation.  In 2017 he had a stress echocardiogram that revealed no evidence of ischemia at a good workload with normal left ventricular systolic function with mild left ventricular hypertrophy and no significant valvar dysfunction.  2017 he complained of brief palpitations and chest pain that was atypical.  Ziopatch showed no arrhythmia and the presence of    Last visit blood pressures been as it is today.  He is walking 2 miles and 45 minutes 3 times a week.  He has had palpitations that occur once or twice a month for a few seconds.  He does not have dyspnea.  He has mild dependent edema.  He has had episodes of chest discomfort that is unchanged and chronic.  And typically occurs after meals.  Also can occur when lying down.  He has been seen by gastroenterology but did not have an EGD.    Past Medical History:   Diagnosis Date   • Angina pectoris (CMS/HCC)    • Aortic calcification (CMS/HCC)    • Arthritis    • CAD (coronary artery disease)      NSTEMI 4/2016: cath with 99% RCA s/p 3.5x12mm WENDIE, 30% ostial LCx, normal LM/LAD   • Cervical radiculopathy    • Chest pain    • Coronary artery disease involving native coronary artery of native heart without angina pectoris    • HERNÁNDEZ (dyspnea on exertion)    • Essential hypertension    • Headache    • Hyperlipidemia    • Hypertension    • Joint pain    • Joint swelling    • Knee pain    • Leg swelling    • Malaise and fatigue    • Moderate mitral regurgitation     by echo 4/2016   • MR (mitral regurgitation)    • Neck pain    • Palpitations    • Pinched nerve in neck 2016   • Precordial pain    • SOB (shortness of breath)      Past Surgical History:   Procedure Laterality Date   • CARDIAC CATHETERIZATION Left 4/28/2016    Procedure: Cardiac catheterization;  Surgeon: Linda Slaughter MD;  Location: [facility] CATH INVASIVE LOCATION;  Service:    • CARDIAC CATHETERIZATION N/A 4/28/2016    Procedure: Left ventriculography;  Surgeon: Linda Slaughter MD;  Location: [facility] CATH INVASIVE LOCATION;  Service:    • CORONARY ANGIOPLASTY WITH STENT PLACEMENT     • CYST REMOVAL  1997    right testicle, benign   • HERNIA REPAIR     • HYDROCELE EXCISION / REPAIR  1997     Outpatient Medications Prior to Visit   Medication Sig Dispense Refill   • amLODIPine (NORVASC) 2.5 MG tablet TAKE 1 TABLET EVERY 12 HOURS 180 tablet 2   • aspirin 81 MG EC tablet Take 81 mg by mouth daily.     • atorvastatin (LIPITOR) 80 MG tablet Take 1 tablet by mouth daily. 90 tablet 3   • gabapentin (NEURONTIN) 600 MG tablet Take 600 mg by mouth 3 (three) times a day     • glucosamine-chondroitin 500-400 MG capsule capsule Take 1 capsule by mouth 2 (Two) Times a Day With Meals.     • lisinopril-hydrochlorothiazide (PRINZIDE,ZESTORETIC) 20-25 MG per tablet TAKE 1 TABLET DAILY 90 tablet 1   • metoprolol tartrate (LOPRESSOR) 50 MG tablet TAKE 1 TABLET TWICE A  tablet 2   • Omega-3 Fatty Acids (FISH OIL) 1000 MG capsule capsule Take 1,000 mg by mouth  Daily With Breakfast.     • VOLTAREN 1 % gel gel As Needed.     • clopidogrel (PLAVIX) 75 MG tablet Take 1 tablet by mouth Daily. 90 tablet 1     No facility-administered medications prior to visit.        Allergies as of 01/03/2019   • (No Known Allergies)     Social History     Socioeconomic History   • Marital status: Single     Spouse name: Not on file   • Number of children: Not on file   • Years of education: Not on file   • Highest education level: Not on file   Social Needs   • Financial resource strain: Not on file   • Food insecurity - worry: Not on file   • Food insecurity - inability: Not on file   • Transportation needs - medical: Not on file   • Transportation needs - non-medical: Not on file   Occupational History     Employer: [employer]   Tobacco Use   • Smoking status: Never Smoker   • Smokeless tobacco: Never Used   Substance and Sexual Activity   • Alcohol use: No     Alcohol/week: 0.0 oz     Frequency: Never     Comment: Daily caffeine use   • Drug use: No   • Sexual activity: Defer   Other Topics Concern   • Not on file   Social History Narrative   • Not on file     Family History   Problem Relation Age of Onset   • Heart disease Mother    • Heart disease Father      Review of Systems   Constitution: Positive for malaise/fatigue. Negative for fever, weight gain and weight loss.   HENT: Negative for ear pain, hearing loss, nosebleeds and sore throat.    Eyes: Negative for double vision, pain, vision loss in left eye and vision loss in right eye.   Cardiovascular:        See history of present illness.   Respiratory: Negative for cough, shortness of breath, sleep disturbances due to breathing, snoring and wheezing.    Endocrine: Negative for cold intolerance, heat intolerance and polyuria.   Skin: Negative for itching, poor wound healing and rash.   Musculoskeletal: Positive for joint pain, joint swelling and myalgias.   Gastrointestinal: Negative for abdominal pain, diarrhea, hematochezia, nausea  "and vomiting.   Genitourinary: Negative for hematuria and hesitancy.   Neurological: Positive for paresthesias. Negative for numbness and seizures.   Psychiatric/Behavioral: Negative for depression. The patient is not nervous/anxious.         Objective:     Vitals:    01/03/19 0941   BP: 130/80   Pulse: 55   Weight: 105 kg (230 lb 12.8 oz)   Height: 170.2 cm (67\")     Body mass index is 36.15 kg/m².    Physical Exam   Constitutional: He is oriented to person, place, and time. He appears well-developed and well-nourished.   Obese   HENT:   Head: Normocephalic.   Nose: Nose normal.   Mouth/Throat: Oropharynx is clear and moist.   Eyes: Conjunctivae and EOM are normal. Pupils are equal, round, and reactive to light. Right eye exhibits no discharge. No scleral icterus.   Neck: Normal range of motion. Neck supple. No JVD present. No thyromegaly present.   Cardiovascular: Normal rate, regular rhythm, normal heart sounds and intact distal pulses. Exam reveals no gallop and no friction rub.   No murmur heard.  Pulses:       Carotid pulses are 2+ on the right side, and 2+ on the left side.       Radial pulses are 2+ on the right side, and 2+ on the left side.        Femoral pulses are 2+ on the right side, and 2+ on the left side.       Popliteal pulses are 2+ on the right side, and 2+ on the left side.        Dorsalis pedis pulses are 2+ on the right side, and 2+ on the left side.        Posterior tibial pulses are 2+ on the right side, and 2+ on the left side.   Pulmonary/Chest: Effort normal and breath sounds normal. No respiratory distress. He has no wheezes. He has no rales.   Abdominal: Soft. Bowel sounds are normal. He exhibits no distension. There is no hepatosplenomegaly. There is no tenderness. There is no rebound.   Musculoskeletal: Normal range of motion. He exhibits no edema or tenderness.   Neurological: He is alert and oriented to person, place, and time.   Skin: Skin is warm and dry. No rash noted. No " erythema.   Psychiatric: He has a normal mood and affect. His behavior is normal. Judgment and thought content normal.   Vitals reviewed.    Lab Review:     ECG 12 Lead  Date/Time: 1/3/2019 9:41 AM  Performed by: Christine Carson MD  Authorized by: Christine Carson MD   Comparison: compared with previous ECG   Similar to previous ECG  Rhythm: sinus rhythm  Clinical impression: normal ECG          Assessment:       Diagnosis Plan   1. Precordial pain  ECG 12 Lead   2. Coronary artery disease involving native coronary artery of native heart without angina pectoris     3. Essential hypertension       Plan:       1   Episodes of palpitations.  On echo and infrequent.  2.  Coronary artery disease, non-ST elevation myocardial infarction, status post drug-coated stent placement.  No anginal symptoms at this time with a negative stress test earlier this year  3.  Hypertension, he will call for remains greater than 130/80.  4.  Mitral regurgitation, now mild  5.  Hyperlipidemia, told was acceptable on recent labs  6.  Chest pain.  He will discuss possible EGD with his primary care physician Dr. Blood.  7.  Obstructive sleep apnea.  On CPAP therapy.           Your medication list           Accurate as of 1/3/19 11:59 PM. If you have any questions, ask your nurse or doctor.               CONTINUE taking these medications      Instructions Last Dose Given Next Dose Due   amLODIPine 2.5 MG tablet  Commonly known as:  NORVASC      TAKE 1 TABLET EVERY 12 HOURS       aspirin 81 MG EC tablet      Take 81 mg by mouth daily.       atorvastatin 80 MG tablet  Commonly known as:  LIPITOR      Take 1 tablet by mouth daily.       clopidogrel 75 MG tablet  Commonly known as:  PLAVIX      Take 1 tablet by mouth Daily.       fish oil 1000 MG capsule capsule      Take 1,000 mg by mouth Daily With Breakfast.       gabapentin 600 MG tablet  Commonly known as:  NEURONTIN      Take 600 mg by mouth 3 (three) times a day       glucosamine-chondroitin 500-400  MG capsule capsule      Take 1 capsule by mouth 2 (Two) Times a Day With Meals.       lisinopril-hydrochlorothiazide 20-25 MG per tablet  Commonly known as:  PRINZIDE,ZESTORETIC      TAKE 1 TABLET DAILY       metoprolol tartrate 50 MG tablet  Commonly known as:  LOPRESSOR      TAKE 1 TABLET TWICE A DAY       VOLTAREN 1 % gel gel  Generic drug:  diclofenac      As Needed.            Dictated utilizing Dragon dictation

## 2019-01-08 RX ORDER — CLOPIDOGREL BISULFATE 75 MG/1
75 TABLET ORAL DAILY
Qty: 90 TABLET | Refills: 1 | Status: SHIPPED | OUTPATIENT
Start: 2019-01-08 | End: 2019-06-24 | Stop reason: SDUPTHER

## 2019-02-22 ENCOUNTER — TELEPHONE (OUTPATIENT)
Dept: CARDIOLOGY | Facility: CLINIC | Age: 49
End: 2019-02-22

## 2019-06-24 ENCOUNTER — OFFICE VISIT (OUTPATIENT)
Dept: CARDIOLOGY | Facility: CLINIC | Age: 49
End: 2019-06-24

## 2019-06-24 VITALS
BODY MASS INDEX: 36.26 KG/M2 | SYSTOLIC BLOOD PRESSURE: 140 MMHG | WEIGHT: 231 LBS | HEIGHT: 67 IN | DIASTOLIC BLOOD PRESSURE: 78 MMHG | HEART RATE: 58 BPM

## 2019-06-24 DIAGNOSIS — I25.10 CORONARY ARTERY DISEASE INVOLVING NATIVE CORONARY ARTERY OF NATIVE HEART WITHOUT ANGINA PECTORIS: ICD-10-CM

## 2019-06-24 DIAGNOSIS — E78.2 MIXED HYPERLIPIDEMIA: ICD-10-CM

## 2019-06-24 DIAGNOSIS — I10 ESSENTIAL HYPERTENSION: ICD-10-CM

## 2019-06-24 DIAGNOSIS — R07.2 PRECORDIAL PAIN: Primary | ICD-10-CM

## 2019-06-24 PROCEDURE — 93000 ELECTROCARDIOGRAM COMPLETE: CPT | Performed by: INTERNAL MEDICINE

## 2019-06-24 PROCEDURE — 99213 OFFICE O/P EST LOW 20 MIN: CPT | Performed by: INTERNAL MEDICINE

## 2019-06-24 RX ORDER — VITAMIN E 200 UNIT
CAPSULE ORAL DAILY
COMMUNITY

## 2019-06-24 RX ORDER — MELOXICAM 15 MG/1
15 TABLET ORAL AS NEEDED
COMMUNITY
End: 2022-01-07 | Stop reason: ALTCHOICE

## 2019-06-24 RX ORDER — BUTALBITAL, ACETAMINOPHEN AND CAFFEINE 50; 325; 40 MG/1; MG/1; MG/1
1 TABLET ORAL AS NEEDED
COMMUNITY
Start: 2019-04-17 | End: 2022-12-21

## 2019-06-24 RX ORDER — SERTRALINE HYDROCHLORIDE 100 MG/1
100 TABLET, FILM COATED ORAL DAILY
COMMUNITY
End: 2021-07-15

## 2019-06-24 RX ORDER — PANTOPRAZOLE SODIUM 40 MG/1
40 TABLET, DELAYED RELEASE ORAL DAILY
COMMUNITY
End: 2022-08-25 | Stop reason: ALTCHOICE

## 2019-06-24 RX ORDER — CLOPIDOGREL BISULFATE 75 MG/1
75 TABLET ORAL DAILY
Qty: 90 TABLET | Refills: 3 | Status: SHIPPED | OUTPATIENT
Start: 2019-06-24 | End: 2019-11-07 | Stop reason: SDUPTHER

## 2019-06-24 RX ORDER — CYCLOBENZAPRINE HCL 10 MG
10 TABLET ORAL AS NEEDED
COMMUNITY
Start: 2019-04-17

## 2019-07-07 ENCOUNTER — TELEPHONE (OUTPATIENT)
Dept: CARDIOLOGY | Facility: CLINIC | Age: 49
End: 2019-07-07

## 2019-07-07 NOTE — TELEPHONE ENCOUNTER
Egd and colonoscopy normal. Please see if GI docs did anything different re chest pain and is he still having this? (my need CPAP followup +/-CT chest. ginetted

## 2019-07-07 NOTE — TELEPHONE ENCOUNTER
Regarding: FW: Test Results Question  Contact: [phone]      ----- Message -----  From: Matthew Morris  Sent: 7/2/2019  12:31 PM  To: Kristin Beauchamp [facility]  Subject: Test Results Question                            ----- Message from Mychart, Generic sent at 7/2/2019 12:31 PM EDT -----    Good morning,   I have attached the completed upper GI and Colonoscopy inof. i have resumes my blood thinners and will hold off on the meloixcam until I see Dr. Blood for oother alernatives.. that and the gabapentin slows me down considerably.    thanks for all you and your team does to take care of me. It is so much appreciate by me and my family.

## 2019-07-08 NOTE — TELEPHONE ENCOUNTER
Please let him know that this pain does not sound cardiac in nature.  Him check with his sleep apnea physician to see if there is possibility this might be due to too much air from his CPAP blowing into his stomach.  Have him call back after he talks to them know that this this is not likely, will order CTAngio scan of her chest

## 2019-07-08 NOTE — TELEPHONE ENCOUNTER
Spoke with pt.  He stated that GI did not do anything different.  He is still having that same pain.  Please advise.

## 2019-07-08 NOTE — TELEPHONE ENCOUNTER
Spoke with pt.  Verbalized understanding.  Pt has appt with MD Renée this week and will call with finds after that appt.

## 2019-07-31 ENCOUNTER — TELEPHONE (OUTPATIENT)
Dept: CARDIOLOGY | Facility: CLINIC | Age: 49
End: 2019-07-31

## 2019-08-01 NOTE — TELEPHONE ENCOUNTER
Labs look okay.  If he saw the sleep physician and they downloaded his device and parameters were met adequately (oxygen saturation leak and AHI index) likely no change to be made.  How is his blood pressure and chest pain. aye

## 2019-08-02 NOTE — TELEPHONE ENCOUNTER
8/2/19  Patient called back - he states he has had 3 episodes of discomfort, which he describes as lasting less than 45 seconds and feels a fluttering/pounding sensation in the center of his chest.  He has not checked his bp but he will keep a bp log over the weekend and call those readings back to you one day next week. /lacy

## 2019-08-05 NOTE — TELEPHONE ENCOUNTER
He had prior monitor in 2017 for similar symptoms that was unremarkable.  Please ask him if the symptoms are the same and find out how is his blood pressure?aye

## 2019-08-05 NOTE — TELEPHONE ENCOUNTER
Spoke with pt and he had similar sx as 2017.  He has NOT taken his BP but will get batteries for his machine and send via [employer].

## 2019-08-15 ENCOUNTER — PATIENT MESSAGE (OUTPATIENT)
Dept: CARDIOLOGY | Facility: CLINIC | Age: 49
End: 2019-08-15

## 2019-08-15 DIAGNOSIS — I10 ESSENTIAL HYPERTENSION: ICD-10-CM

## 2019-08-16 RX ORDER — METOPROLOL TARTRATE 75 MG/1
50 TABLET, FILM COATED ORAL 2 TIMES DAILY
Qty: 180 TABLET | Refills: 1 | Status: SHIPPED | OUTPATIENT
Start: 2019-08-16 | End: 2020-10-08 | Stop reason: SDUPTHER

## 2019-08-16 NOTE — TELEPHONE ENCOUNTER
Increase metoprolol to 75 mg twice daily and call with blood pressure and heart rate next week. aye

## 2019-11-08 RX ORDER — CLOPIDOGREL BISULFATE 75 MG/1
75 TABLET ORAL DAILY
Qty: 90 TABLET | Refills: 3 | Status: SHIPPED | OUTPATIENT
Start: 2019-11-08 | End: 2020-08-14 | Stop reason: SDUPTHER

## 2020-01-31 ENCOUNTER — OFFICE VISIT (OUTPATIENT)
Dept: CARDIOLOGY | Facility: CLINIC | Age: 50
End: 2020-01-31

## 2020-01-31 VITALS
BODY MASS INDEX: 35.09 KG/M2 | WEIGHT: 223.6 LBS | SYSTOLIC BLOOD PRESSURE: 138 MMHG | HEART RATE: 59 BPM | DIASTOLIC BLOOD PRESSURE: 84 MMHG | HEIGHT: 67 IN

## 2020-01-31 DIAGNOSIS — Z01.810 PREOP CARDIOVASCULAR EXAM: Primary | ICD-10-CM

## 2020-01-31 DIAGNOSIS — I10 ESSENTIAL HYPERTENSION: ICD-10-CM

## 2020-01-31 DIAGNOSIS — I25.10 CORONARY ARTERY DISEASE INVOLVING NATIVE CORONARY ARTERY OF NATIVE HEART WITHOUT ANGINA PECTORIS: ICD-10-CM

## 2020-01-31 DIAGNOSIS — R07.2 PRECORDIAL PAIN: ICD-10-CM

## 2020-01-31 PROCEDURE — 93000 ELECTROCARDIOGRAM COMPLETE: CPT | Performed by: INTERNAL MEDICINE

## 2020-01-31 PROCEDURE — 99214 OFFICE O/P EST MOD 30 MIN: CPT | Performed by: INTERNAL MEDICINE

## 2020-02-02 PROBLEM — Z01.810 PREOP CARDIOVASCULAR EXAM: Status: ACTIVE | Noted: 2020-02-02

## 2020-02-17 ENCOUNTER — HOSPITAL ENCOUNTER (OUTPATIENT)
Dept: CARDIOLOGY | Facility: HOSPITAL | Age: 50
Discharge: HOME | End: 2020-02-17
Admitting: INTERNAL MEDICINE

## 2020-02-17 VITALS
SYSTOLIC BLOOD PRESSURE: 148 MMHG | HEIGHT: 67 IN | WEIGHT: 223 LBS | DIASTOLIC BLOOD PRESSURE: 90 MMHG | HEART RATE: 76 BPM | BODY MASS INDEX: 35 KG/M2

## 2020-02-17 DIAGNOSIS — I25.10 CORONARY ARTERY DISEASE INVOLVING NATIVE CORONARY ARTERY OF NATIVE HEART WITHOUT ANGINA PECTORIS: ICD-10-CM

## 2020-02-17 DIAGNOSIS — Z01.810 PREOP CARDIOVASCULAR EXAM: ICD-10-CM

## 2020-02-17 DIAGNOSIS — R07.2 PRECORDIAL PAIN: ICD-10-CM

## 2020-02-17 LAB
AORTIC ARCH: 2.1 CM
AORTIC ROOT ANNULUS: 2.3 CM
ASCENDING AORTA: 3.3 CM
BH CV ECHO MEAS - ACS: 2.1 CM
BH CV ECHO MEAS - AO MAX PG: 4.6 MMHG
BH CV ECHO MEAS - AO ROOT AREA (BSA CORRECTED): 1.4
BH CV ECHO MEAS - AO ROOT AREA: 7.2 CM^2
BH CV ECHO MEAS - AO ROOT DIAM: 3 CM
BH CV ECHO MEAS - AO V2 MAX: 107.6 CM/SEC
BH CV ECHO MEAS - ASC AORTA: 3.3 CM
BH CV ECHO MEAS - BSA(HAYCOCK): 2.2 M^2
BH CV ECHO MEAS - BSA: 2.1 M^2
BH CV ECHO MEAS - BZI_BMI: 34.9 KILOGRAMS/M^2
BH CV ECHO MEAS - BZI_METRIC_HEIGHT: 170.2 CM
BH CV ECHO MEAS - BZI_METRIC_WEIGHT: 101.2 KG
BH CV ECHO MEAS - EDV(MOD-SP4): 82 ML
BH CV ECHO MEAS - EDV(TEICH): 149.7 ML
BH CV ECHO MEAS - EF(CUBED): 70.8 %
BH CV ECHO MEAS - EF(MOD-BP): 60 %
BH CV ECHO MEAS - EF(MOD-SP4): 69.5 %
BH CV ECHO MEAS - EF(TEICH): 61.8 %
BH CV ECHO MEAS - ESV(MOD-SP4): 25 ML
BH CV ECHO MEAS - ESV(TEICH): 57.2 ML
BH CV ECHO MEAS - FS: 33.6 %
BH CV ECHO MEAS - IVS/LVPW: 1
BH CV ECHO MEAS - IVSD: 1.2 CM
BH CV ECHO MEAS - LAT PEAK E' VEL: 10 CM/SEC
BH CV ECHO MEAS - LV DIASTOLIC VOL/BSA (35-75): 38.7 ML/M^2
BH CV ECHO MEAS - LV MASS(C)D: 268 GRAMS
BH CV ECHO MEAS - LV MASS(C)DI: 126.5 GRAMS/M^2
BH CV ECHO MEAS - LV SYSTOLIC VOL/BSA (12-30): 11.8 ML/M^2
BH CV ECHO MEAS - LVIDD: 5.5 CM
BH CV ECHO MEAS - LVIDS: 3.7 CM
BH CV ECHO MEAS - LVLD AP4: 7.7 CM
BH CV ECHO MEAS - LVLS AP4: 6.8 CM
BH CV ECHO MEAS - LVPWD: 1.2 CM
BH CV ECHO MEAS - MED PEAK E' VEL: 8 CM/SEC
BH CV ECHO MEAS - MR MAX PG: 45.2 MMHG
BH CV ECHO MEAS - MR MAX VEL: 336.3 CM/SEC
BH CV ECHO MEAS - MV A DUR: 0.14 SEC
BH CV ECHO MEAS - MV A MAX VEL: 57 CM/SEC
BH CV ECHO MEAS - MV DEC SLOPE: 387.6 CM/SEC^2
BH CV ECHO MEAS - MV DEC TIME: 0.18 SEC
BH CV ECHO MEAS - MV E MAX VEL: 74.6 CM/SEC
BH CV ECHO MEAS - MV E/A: 1.3
BH CV ECHO MEAS - MV P1/2T MAX VEL: 75.4 CM/SEC
BH CV ECHO MEAS - MV P1/2T: 57 MSEC
BH CV ECHO MEAS - MVA P1/2T LCG: 2.9 CM^2
BH CV ECHO MEAS - MVA(P1/2T): 3.9 CM^2
BH CV ECHO MEAS - PULM A REVS DUR: 0.11 SEC
BH CV ECHO MEAS - PULM A REVS VEL: 24.4 CM/SEC
BH CV ECHO MEAS - PULM DIAS VEL: 27 CM/SEC
BH CV ECHO MEAS - PULM S/D: 1.7
BH CV ECHO MEAS - PULM SYS VEL: 45.4 CM/SEC
BH CV ECHO MEAS - SI(CUBED): 56.7 ML/M^2
BH CV ECHO MEAS - SI(MOD-SP4): 26.9 ML/M^2
BH CV ECHO MEAS - SI(TEICH): 43.7 ML/M^2
BH CV ECHO MEAS - SV(CUBED): 120.2 ML
BH CV ECHO MEAS - SV(MOD-SP4): 57 ML
BH CV ECHO MEAS - SV(TEICH): 92.6 ML
BH CV ECHO MEAS - TAPSE (>1.6): 1.9 CM2
BH CV ECHO MEASUREMENTS AVERAGE E/E' RATIO: 8.29
BH CV STRESS BP STAGE 1: NORMAL
BH CV STRESS BP STAGE 2: NORMAL
BH CV STRESS BP STAGE 3: NORMAL
BH CV STRESS BP STAGE 4: NORMAL
BH CV STRESS DURATION MIN STAGE 1: 3
BH CV STRESS DURATION MIN STAGE 2: 3
BH CV STRESS DURATION MIN STAGE 3: 3
BH CV STRESS DURATION MIN STAGE 4: 3
BH CV STRESS DURATION SEC STAGE 1: 0
BH CV STRESS DURATION SEC STAGE 2: 0
BH CV STRESS DURATION SEC STAGE 3: 0
BH CV STRESS DURATION SEC STAGE 4: 0
BH CV STRESS ECHO POST STRESS EJECTION FRACTION EF: 70 %
BH CV STRESS GRADE STAGE 1: 10
BH CV STRESS GRADE STAGE 2: 12
BH CV STRESS GRADE STAGE 3: 14
BH CV STRESS GRADE STAGE 4: 16
BH CV STRESS HR STAGE 1: 97
BH CV STRESS HR STAGE 2: 112
BH CV STRESS HR STAGE 3: 126
BH CV STRESS HR STAGE 4: 149
BH CV STRESS METS STAGE 1: 5
BH CV STRESS METS STAGE 2: 7.5
BH CV STRESS METS STAGE 3: 10
BH CV STRESS METS STAGE 4: 13.5
BH CV STRESS PROTOCOL 1: NORMAL
BH CV STRESS SPEED STAGE 1: 1.7
BH CV STRESS SPEED STAGE 2: 2.5
BH CV STRESS SPEED STAGE 3: 3.4
BH CV STRESS SPEED STAGE 4: 4.2
BH CV STRESS STAGE 1: 1
BH CV STRESS STAGE 2: 2
BH CV STRESS STAGE 3: 3
BH CV STRESS STAGE 4: 4
BH CV XLRA - RV BASE: 3.3 CM
BH CV XLRA - RV LENGTH: 7.1 CM
BH CV XLRA - RV MID: 2.8 CM
BH CV XLRA - TDI S': 11 CM/SEC
LEFT ATRIUM VOLUME INDEX: 14 ML/M2
MAXIMAL PREDICTED HEART RATE: 169 BPM
PERCENT MAX PREDICTED HR: 88.17 %
SINUS: 3.8 CM
STJ: 3.1 CM
STRESS BASELINE BP: NORMAL MMHG
STRESS BASELINE HR: 76 BPM
STRESS O2 SAT REST: 99 %
STRESS PERCENT HR: 104 %
STRESS POST ESTIMATED WORKLOAD: 13 METS
STRESS POST EXERCISE DUR MIN: 11 MIN
STRESS POST EXERCISE DUR SEC: 30 SEC
STRESS POST PEAK BP: NORMAL MMHG
STRESS POST PEAK HR: 149 BPM
STRESS TARGET HR: 144 BPM

## 2020-02-17 PROCEDURE — 93325 DOPPLER ECHO COLOR FLOW MAPG: CPT | Performed by: INTERNAL MEDICINE

## 2020-02-17 PROCEDURE — 93320 DOPPLER ECHO COMPLETE: CPT | Performed by: INTERNAL MEDICINE

## 2020-02-17 PROCEDURE — 93320 DOPPLER ECHO COMPLETE: CPT

## 2020-02-17 PROCEDURE — 93017 CV STRESS TEST TRACING ONLY: CPT

## 2020-02-17 PROCEDURE — 93352 ADMIN ECG CONTRAST AGENT: CPT | Performed by: INTERNAL MEDICINE

## 2020-02-17 PROCEDURE — 93350 STRESS TTE ONLY: CPT

## 2020-02-17 PROCEDURE — 93016 CV STRESS TEST SUPVJ ONLY: CPT | Performed by: INTERNAL MEDICINE

## 2020-02-17 PROCEDURE — 93018 CV STRESS TEST I&R ONLY: CPT | Performed by: INTERNAL MEDICINE

## 2020-02-17 PROCEDURE — 93325 DOPPLER ECHO COLOR FLOW MAPG: CPT

## 2020-02-17 PROCEDURE — 93350 STRESS TTE ONLY: CPT | Performed by: INTERNAL MEDICINE

## 2020-02-17 PROCEDURE — 25010000002 PERFLUTREN (DEFINITY) 8.476 MG IN SODIUM CHLORIDE 0.9 % 10 ML INJECTION: Performed by: INTERNAL MEDICINE

## 2020-02-17 RX ADMIN — PERFLUTREN 3 ML: 6.52 INJECTION, SUSPENSION INTRAVENOUS at 10:36

## 2020-02-20 ENCOUNTER — TELEPHONE (OUTPATIENT)
Dept: CARDIOLOGY | Facility: CLINIC | Age: 50
End: 2020-02-20

## 2020-02-21 NOTE — TELEPHONE ENCOUNTER
Pt called back in for echo results I let him know he was also cleared for knee surgery per MKDARSHANA.     thanks  SW

## 2020-02-21 NOTE — TELEPHONE ENCOUNTER
Please let the patient know that the stress echocardiogram did not show any blockages and the heart is strong.  There is slight thickening of the heart muscle which happens from high blood pressure.  Have him call if blood pressure remains greater than 130/80 mmHg.  He is cleared to proceed with knee surgery.aye

## 2020-05-02 ENCOUNTER — HOSPITAL ENCOUNTER (OUTPATIENT)
Dept: URGENT CARE | Facility: CLINIC | Age: 50
Discharge: HOME | End: 2020-05-02

## 2020-08-14 DIAGNOSIS — I10 ESSENTIAL HYPERTENSION: ICD-10-CM

## 2020-08-14 RX ORDER — AMLODIPINE BESYLATE 2.5 MG/1
2.5 TABLET ORAL EVERY 12 HOURS
Qty: 180 TABLET | Refills: 3 | Status: SHIPPED | OUTPATIENT
Start: 2020-08-14 | End: 2021-01-14 | Stop reason: SDUPTHER

## 2020-08-14 RX ORDER — LISINOPRIL AND HYDROCHLOROTHIAZIDE 25; 20 MG/1; MG/1
1 TABLET ORAL DAILY
Qty: 90 TABLET | Refills: 3 | Status: SHIPPED | OUTPATIENT
Start: 2020-08-14 | End: 2023-04-03

## 2020-08-14 RX ORDER — CLOPIDOGREL BISULFATE 75 MG/1
75 TABLET ORAL DAILY
Qty: 90 TABLET | Refills: 3 | Status: SHIPPED | OUTPATIENT
Start: 2020-08-14 | End: 2021-01-14 | Stop reason: SDUPTHER

## 2020-10-06 ENCOUNTER — PATIENT MESSAGE (OUTPATIENT)
Dept: CARDIOLOGY | Facility: CLINIC | Age: 50
End: 2020-10-06

## 2020-10-08 DIAGNOSIS — I10 ESSENTIAL HYPERTENSION: ICD-10-CM

## 2020-10-09 ENCOUNTER — TELEPHONE (OUTPATIENT)
Dept: CARDIOLOGY | Facility: CLINIC | Age: 50
End: 2020-10-09

## 2020-10-09 RX ORDER — METOPROLOL TARTRATE 75 MG/1
75 TABLET, FILM COATED ORAL 2 TIMES DAILY
Qty: 180 TABLET | Refills: 0 | Status: SHIPPED | OUTPATIENT
Start: 2020-10-09 | End: 2021-01-04 | Stop reason: SDUPTHER

## 2020-10-09 RX ORDER — METOPROLOL TARTRATE 50 MG/1
50 TABLET, FILM COATED ORAL 2 TIMES DAILY
Qty: 60 TABLET | Refills: 0 | Status: SHIPPED | OUTPATIENT
Start: 2020-10-09 | End: 2020-10-09 | Stop reason: SDUPTHER

## 2020-10-09 NOTE — TELEPHONE ENCOUNTER
Pt stated the last time he was in the office to see Dr Carson, Metoprolol was increased to 75mg BID.  I have pended rx to sign off.  [facility].  I have called to request.

## 2020-10-09 NOTE — TELEPHONE ENCOUNTER
SEE TELEPHONE NOTE.   REQUESTING COPY OF LABS. PATIENT HAS APPOINTMENT SCHEDULED IN November AND HAS BEEN SEEN WITHIN THE PAST YEAR.

## 2020-10-09 NOTE — TELEPHONE ENCOUNTER
Ok thanks, please also find out how HR and BP running on increased dose.  Will await recent labs as well.  Thanks

## 2020-10-09 NOTE — TELEPHONE ENCOUNTER
Please call patient to confirm he is still taking metoprolol and confirm dose and also request most recent labs from PCP

## 2021-01-04 RX ORDER — METOPROLOL TARTRATE 75 MG/1
75 TABLET, FILM COATED ORAL 2 TIMES DAILY
Qty: 180 TABLET | Refills: 0 | Status: SHIPPED | OUTPATIENT
Start: 2021-01-04 | End: 2021-01-14 | Stop reason: SDUPTHER

## 2021-01-12 ENCOUNTER — TELEPHONE (OUTPATIENT)
Dept: CARDIOLOGY | Facility: CLINIC | Age: 51
End: 2021-01-12

## 2021-01-12 NOTE — TELEPHONE ENCOUNTER
Called and discussed CDC recommendations and benefits versus risks of vaccination in detail with patient as well as common vaccination side effects.  He denies having any history of any severe vaccine reactions or anaphylactic reactions.  CDC does recommend that high risk patients, including people with coronary history, get the vaccine as long as no contraindications.  He reports he is scheduled to get the vaccine and plans to do so.

## 2021-01-12 NOTE — TELEPHONE ENCOUNTER
----- Message from Nahomi Jacobo MA sent at 1/12/2021  3:54 PM EST -----  Regarding: FW: Non-Urgent Medical Question  Contact: [phone]    ----- Message -----  From: Matthew Morris  Sent: 1/12/2021   8:40 AM EST  To: Kristin lucita [facility]  Subject: Non-Urgent Medical Question                      good Morning,   I have been given the chance to take the Moderna Covid 19 vaccination. Is there any concerns or precautions that i should be aware of. Do you recommend for or against?    thanks     matthew

## 2021-01-14 ENCOUNTER — OFFICE VISIT (OUTPATIENT)
Dept: CARDIOLOGY | Facility: CLINIC | Age: 51
End: 2021-01-14

## 2021-01-14 VITALS
SYSTOLIC BLOOD PRESSURE: 110 MMHG | HEIGHT: 67 IN | HEART RATE: 56 BPM | WEIGHT: 227.4 LBS | BODY MASS INDEX: 35.69 KG/M2 | DIASTOLIC BLOOD PRESSURE: 72 MMHG

## 2021-01-14 DIAGNOSIS — I10 ESSENTIAL HYPERTENSION: ICD-10-CM

## 2021-01-14 DIAGNOSIS — E78.2 MIXED HYPERLIPIDEMIA: ICD-10-CM

## 2021-01-14 DIAGNOSIS — I25.10 CORONARY ARTERY DISEASE INVOLVING NATIVE CORONARY ARTERY OF NATIVE HEART WITHOUT ANGINA PECTORIS: Primary | ICD-10-CM

## 2021-01-14 DIAGNOSIS — G47.33 OSA (OBSTRUCTIVE SLEEP APNEA): ICD-10-CM

## 2021-01-14 PROCEDURE — 99214 OFFICE O/P EST MOD 30 MIN: CPT | Performed by: NURSE PRACTITIONER

## 2021-01-14 PROCEDURE — 93000 ELECTROCARDIOGRAM COMPLETE: CPT | Performed by: NURSE PRACTITIONER

## 2021-01-14 RX ORDER — CLOPIDOGREL BISULFATE 75 MG/1
75 TABLET ORAL DAILY
Qty: 90 TABLET | Refills: 1 | Status: SHIPPED | OUTPATIENT
Start: 2021-01-14

## 2021-01-14 RX ORDER — AMLODIPINE BESYLATE 2.5 MG/1
2.5 TABLET ORAL EVERY 12 HOURS
Qty: 180 TABLET | Refills: 1 | Status: SHIPPED | OUTPATIENT
Start: 2021-01-14 | End: 2021-07-15 | Stop reason: SDUPTHER

## 2021-01-14 RX ORDER — LISINOPRIL AND HYDROCHLOROTHIAZIDE 25; 20 MG/1; MG/1
1 TABLET ORAL DAILY
Qty: 90 TABLET | Refills: 3 | Status: CANCELLED | OUTPATIENT
Start: 2021-01-14

## 2021-01-14 RX ORDER — ATORVASTATIN CALCIUM 80 MG/1
80 TABLET, FILM COATED ORAL DAILY
Qty: 90 TABLET | Refills: 3 | Status: CANCELLED | OUTPATIENT
Start: 2021-01-14

## 2021-01-14 RX ORDER — METOPROLOL TARTRATE 75 MG/1
75 TABLET, FILM COATED ORAL 2 TIMES DAILY
Qty: 180 TABLET | Refills: 1 | Status: SHIPPED | OUTPATIENT
Start: 2021-01-14 | End: 2021-07-15 | Stop reason: SDUPTHER

## 2021-01-14 NOTE — PROGRESS NOTES
Date of Office Visit: 2021  Encounter Provider: Elenita Herrera, TYRA, APRN  Place of Service: [facility] CARDIOLOGY  Patient Name: Matthew Morris  :1970        Subjective:     Chief Complaint:  Follow-up, coronary artery disease, hypertension, dyslipidemia, obstructive sleep apnea      History of Present Illness:  Matthew Morris is a 50 y.o. male patient of Dr. Carson.  This patient is new to me and I have reviewed his records.    Patient has a history of coronary artery disease, hypertension, non-ST elevation MI, palpitations, obstructive sleep apnea on CPAP, dyslipidemia.    Patient was transferred from [facility] 2016 after chest pain and elevated troponin consistent with non-ST elevation myocardial infarction.  Echocardiogram showed EF of 40% and heart catheterization showed severe disease of the right coronary artery for which she received a drug-eluting stent.  He has had intermittent chest pain since that time in the setting of hypertension.  He was later admitted 2016 for an episode of chest pain and ruled out for myocardial infarction.  Echo showed EF improved to 50%, mild to moderate mitral regurgitation.  2017 he had a stress echo showing no evidence of ischemia at a good workload and normal left ventricular systolic function with mild LVH and no significant valvular dysfunction.  He complained of brief palpitations and atypical chest discomfort and Zio patch showed no arrhythmia.  He was seen in the office 2020 by Dr. Carson for surgery clearance prior to knee replacement.  Stress echo showed normal LV systolic function with EF of 61 to 65%, mild concentric LVH, mildly dilated left ventricular cavity, trace aortic regurgitation, trace mitral regurgitation, trace tricuspid regurgitation, normal exercise EKG and normal exercise echocardiogram without significant echocardiographic evidence of ischemia.      Patient presents to office today  for follow-up appointment.  Patient reports he is feeling well overall since last visit.  He is walking daily for about 2 miles at a time in about an hour.  Denies exertional symptoms or concerns.  Denies chest pain or discomfort, shortness of breath, shortness of breath with exertion, palpitations, syncope, falls, or abnormal bleeding.  He has some occasional joint swelling that he attributes to getting older may be possible arthritis.  He had one episode of lightheadedness several months ago when he thinks he got out of bed too quickly in the morning but has not had recurrence since.  He is using CPAP at night.  Blood pressure well controlled in the office today.  He got the first COVID-19 vaccine yesterday and is feeling well, maybe some slight fatigue or body aches today but nothing significant.  He is trying to eat healthy and watch what he eats and at times is following a vegan diet.          Past Medical History:   Diagnosis Date   • Angina pectoris (CMS/[facility])    • Aortic calcification (CMS/HCC) 2008   • Arthritis    • CAD (coronary artery disease)     NSTEMI 4/2016: cath with 99% RCA s/p 3.5x12mm WENDIE, 30% ostial LCx, normal LM/LAD   • Cervical radiculopathy    • Chest pain    • Coronary artery disease involving native coronary artery of native heart without angina pectoris    • HERNÁNDEZ (dyspnea on exertion)    • Essential hypertension    • Headache    • Hyperlipidemia    • Hypertension    • Joint pain    • Joint swelling    • Knee pain    • Leg swelling    • Malaise and fatigue    • Moderate mitral regurgitation     by echo 4/2016   • MR (mitral regurgitation)    • Neck pain    • Palpitations    • Pinched nerve in neck 2016   • Precordial pain    • SOB (shortness of breath)      Past Surgical History:   Procedure Laterality Date   • CARDIAC CATHETERIZATION Left 4/28/2016    Procedure: Cardiac catheterization;  Surgeon: Linda Slaughter MD;  Location: [facility] CATH INVASIVE LOCATION;  Service:    • CARDIAC  CATHETERIZATION N/A 4/28/2016    Procedure: Left ventriculography;  Surgeon: Linda Slaughter MD;  Location: [facility] CATH INVASIVE LOCATION;  Service:    • CORONARY ANGIOPLASTY WITH STENT PLACEMENT     • CYST REMOVAL  1997    right testicle, benign   • HERNIA REPAIR     • HYDROCELE EXCISION / REPAIR  1997     Outpatient Medications Prior to Visit   Medication Sig Dispense Refill   • aspirin 81 MG EC tablet Take 81 mg by mouth daily.     • atorvastatin (LIPITOR) 80 MG tablet Take 1 tablet by mouth daily. 90 tablet 3   • butalbital-acetaminophen-caffeine (FIORICET, ESGIC) -40 MG per tablet Take 1 tablet by mouth As Needed.     • cyclobenzaprine (FLEXERIL) 10 MG tablet Take 10 mg by mouth As Needed.     • lisinopril-hydrochlorothiazide (PRINZIDE,ZESTORETIC) 20-25 MG per tablet Take 1 tablet by mouth Daily. 90 tablet 3   • MEGARED OMEGA-3 KRILL  MG capsule Take  by mouth Daily.     • meloxicam (MOBIC) 15 MG tablet Take 15 mg by mouth As Needed.     • Omega-3 Fatty Acids (FISH OIL) 1000 MG capsule capsule Take 1,000 mg by mouth Daily With Breakfast.     • pantoprazole (PROTONIX) 40 MG EC tablet Take 40 mg by mouth Daily.     • sertraline (ZOLOFT) 100 MG tablet Take 100 mg by mouth Daily.     • VOLTAREN 1 % gel gel As Needed.     • amLODIPine (NORVASC) 2.5 MG tablet Take 1 tablet by mouth Every 12 (Twelve) Hours. 180 tablet 3   • clopidogrel (PLAVIX) 75 MG tablet Take 1 tablet by mouth Daily. 90 tablet 3   • Metoprolol Tartrate 75 MG tablet Take 75 mg by mouth 2 (Two) Times a Day. 180 tablet 0   • gabapentin (NEURONTIN) 600 MG tablet Take 600 mg by mouth 3 (three) times a day       No facility-administered medications prior to visit.        Allergies as of 01/14/2021   • (No Known Allergies)     Social History     Socioeconomic History   • Marital status:      Spouse name: Not on file   • Number of children: Not on file   • Years of education: Not on file   • Highest education level: Not on file  "  Occupational History     Employer: "[employer]   Tobacco Use   • Smoking status: Never Smoker   • Smokeless tobacco: Never Used   Substance and Sexual Activity   • Alcohol use: No     Alcohol/week: 0.0 standard drinks     Frequency: Never     Comment: Daily caffeine use   • Drug use: No   • Sexual activity: Defer     Family History   Problem Relation Age of Onset   • Heart disease Mother    • Heart disease Father          Review of Systems   Constitution: Negative for chills, fever, malaise/fatigue, weight gain and weight loss.   HENT: Negative for ear pain, hearing loss, nosebleeds and sore throat.    Eyes: Negative for blurred vision, double vision, redness, vision loss in left eye, vision loss in right eye and visual disturbance.   Cardiovascular:        SEE HPI   Respiratory: Negative for cough, shortness of breath, snoring and wheezing.    Endocrine: Negative for cold intolerance and heat intolerance.   Hematologic/Lymphatic: Negative for bleeding problem.   Skin: Negative for itching, rash and suspicious lesions.   Musculoskeletal: Positive for joint pain (knees). Negative for falls, joint swelling and myalgias.   Gastrointestinal: Negative for abdominal pain, diarrhea, hematemesis, melena, nausea and vomiting.   Genitourinary: Negative for dysuria, frequency and hematuria.   Neurological: Negative for dizziness, headaches, numbness, paresthesias and seizures.   Psychiatric/Behavioral: Negative for altered mental status and depression. The patient is not nervous/anxious.               Objective:     Vitals:    01/14/21 1442   BP: 110/72   BP Location: Right arm   Cuff Size: Adult   Pulse: 56   Weight: 103 kg (227 lb 6.4 oz)   Height: 170.2 cm (67\")     Body mass index is 35.62 kg/m².      PHYSICAL EXAM:  Constitutional:       General: Not in acute distress.     Appearance: Well-developed. Not diaphoretic.   Eyes:      Pupils: Pupils are equal, round, and reactive to light.   HENT:      Head: Normocephalic and " atraumatic.   Neck:      Musculoskeletal: Neck supple.      Vascular: No carotid bruit or JVD.   Pulmonary:      Effort: Pulmonary effort is normal. No respiratory distress.      Breath sounds: Normal breath sounds. No wheezing. No rales.   Cardiovascular:      Normal rate. Regular rhythm.      Murmurs: There is no murmur.      No gallop. No click. No rub.   Edema:     Peripheral edema absent.   Abdominal:      General: Bowel sounds are normal. There is no distension.      Palpations: Abdomen is soft.   Musculoskeletal: Normal range of motion.         General: No tenderness or deformity.   Skin:     General: Skin is warm and dry.      Findings: No erythema or rash.   Neurological:      Mental Status: Alert and oriented to person, place, and time.   Psychiatric:         Behavior: Behavior normal.         Judgment: Judgment normal.             ECG 12 Lead    Date/Time: 1/14/2021 3:55 PM  Performed by: Elenita Herrera DNP, APRN  Authorized by: Elenita Herrera DNP, APRN   Comparison: compared with previous ECG from 1/31/2020  Rhythm: sinus rhythm  BPM: 56  Comments: No significant changes from previous EKG              Assessment:       Diagnosis Plan   1. Coronary artery disease involving native coronary artery of native heart without angina pectoris     2. Essential hypertension  amLODIPine (NORVASC) 2.5 MG tablet   3. Mixed hyperlipidemia     4. EVON (obstructive sleep apnea)           Plan:     1. Coronary artery disease: With history of non-ST elevation myocardial infarction status post drug-eluting stent placement.  Negative stress test 3/2017 and negative stress echo 2/2020.  He is exercising regularly and denies anginal symptoms or concerns.  Continue with aggressive risk factor modification.  2. Hypertension: Blood pressure well controlled in the office today.  Patient continue to monitor and notify office of high or low readings, parameters given.  He will especially watch for any low readings during the  summer months.  3. Hyperlipidemia: Reports his primary care provider is checking labs including cholesterol panel.  We will request a copy of these to review.  4. Obstructive sleep apnea: On CPAP therapy.  5. History of palpitations: Infrequent in the past.  Denies recent.    Patient to follow-up with Dr. Carson in 6 months per patient preference or follow-up sooner if needed for any new, recurrent, or worsening symptoms or other issues or concerns.        Reviewed 2/2020 office note, 2/2020 stress echo, and 2/2020 CBC, CMP, lipid panel, and A1c.           Your medication list          Accurate as of January 14, 2021  3:56 PM. If you have any questions, ask your nurse or doctor.            CONTINUE taking these medications      Instructions Last Dose Given Next Dose Due   amLODIPine 2.5 MG tablet  Commonly known as: NORVASC      Take 1 tablet by mouth Every 12 (Twelve) Hours.       aspirin 81 MG EC tablet      Take 81 mg by mouth daily.       atorvastatin 80 MG tablet  Commonly known as: LIPITOR      Take 1 tablet by mouth daily.       butalbital-acetaminophen-caffeine -40 MG per tablet  Commonly known as: FIORICET, ESGIC      Take 1 tablet by mouth As Needed.       clopidogrel 75 MG tablet  Commonly known as: PLAVIX      Take 1 tablet by mouth Daily.       cyclobenzaprine 10 MG tablet  Commonly known as: FLEXERIL      Take 10 mg by mouth As Needed.       fish oil 1000 MG capsule capsule      Take 1,000 mg by mouth Daily With Breakfast.       lisinopril-hydrochlorothiazide 20-25 MG per tablet  Commonly known as: PRINZIDE,ZESTORETIC      Take 1 tablet by mouth Daily.       MegaRed Omega-3 Krill Oil 500 MG capsule      Take  by mouth Daily.       meloxicam 15 MG tablet  Commonly known as: MOBIC      Take 15 mg by mouth As Needed.       Metoprolol Tartrate 75 MG tablet      Take 75 mg by mouth 2 (Two) Times a Day.       pantoprazole 40 MG EC tablet  Commonly known as: PROTONIX      Take 40 mg by mouth Daily.        sertraline 100 MG tablet  Commonly known as: ZOLOFT      Take 100 mg by mouth Daily.       Voltaren 1 % gel gel  Generic drug: Diclofenac Sodium      As Needed.             Where to Get Your Medications      These medications were sent to [facility] [place] [place] [facility] [place] - [address] - [phone]  - [phone] FX  [address] [place] [place] [address]    Phone: [phone]   · amLODIPine 2.5 MG tablet  · clopidogrel 75 MG tablet  · Metoprolol Tartrate 75 MG tablet         I did not stop or change the above medications.  Patient's medication list was updated to reflect medications they are currently taking including medication changes made by other providers.            Thanks,    Elenita Herrera, DNP, APRN  01/14/2021         Dictated utilizing Dragon dictation

## 2021-06-08 ENCOUNTER — TELEMEDICINE (OUTPATIENT)
Dept: FAMILY MEDICINE CLINIC | Facility: TELEHEALTH | Age: 51
End: 2021-06-08

## 2021-06-08 DIAGNOSIS — J01.90 ACUTE NON-RECURRENT SINUSITIS, UNSPECIFIED LOCATION: Primary | ICD-10-CM

## 2021-06-08 DIAGNOSIS — J30.9 ALLERGIC RHINITIS, UNSPECIFIED SEASONALITY, UNSPECIFIED TRIGGER: ICD-10-CM

## 2021-06-08 PROCEDURE — 99213 OFFICE O/P EST LOW 20 MIN: CPT | Performed by: NURSE PRACTITIONER

## 2021-06-08 RX ORDER — AMOXICILLIN AND CLAVULANATE POTASSIUM 875; 125 MG/1; MG/1
1 TABLET, FILM COATED ORAL 2 TIMES DAILY
Qty: 14 TABLET | Refills: 0 | Status: SHIPPED | OUTPATIENT
Start: 2021-06-08 | End: 2021-06-15

## 2021-06-08 RX ORDER — METHYLPREDNISOLONE 4 MG/1
TABLET ORAL
Qty: 21 TABLET | Refills: 0 | Status: SHIPPED | OUTPATIENT
Start: 2021-06-08 | End: 2021-07-15

## 2021-06-08 RX ORDER — FLUTICASONE PROPIONATE 50 MCG
2 SPRAY, SUSPENSION (ML) NASAL DAILY
Qty: 16 ML | Refills: 0 | Status: SHIPPED | OUTPATIENT
Start: 2021-06-08

## 2021-06-08 NOTE — PROGRESS NOTES
Subjective   Chief Complaint   Patient presents with   • Sinusitis       Matthew Morris is a 51 y.o. male.     Pt reports nasal congestion, rhinorrhea, sinus HA/pressure, cough, sneezing, watery eyes x 5 days after cutting the grass. Cough is dry and comes in spells. Symptoms seem to be worsening despite OTC treatment. He has had sinus infections in the past with similar symptoms. Denies fever, chills, body aches, SOA, sick contacts. He has been fully vaccinated against COVID.     Sinusitis  This is a new problem. Episode onset: 5 days. The problem has been gradually worsening since onset. There has been no fever. Associated symptoms include congestion, coughing, headaches, sinus pressure and sneezing. Pertinent negatives include no chills, diaphoresis, ear pain, hoarse voice, neck pain, shortness of breath, sore throat or swollen glands. Treatments tried: zycam, sudafed sinus, throat lozenges.        No Known Allergies    Past Medical History:   Diagnosis Date   • Angina pectoris (CMS/[facility])    • Aortic calcification (CMS/HCC) 2008   • Arthritis    • CAD (coronary artery disease)     NSTEMI 4/2016: cath with 99% RCA s/p 3.5x12mm WENDIE, 30% ostial LCx, normal LM/LAD   • Cervical radiculopathy    • Chest pain    • Coronary artery disease involving native coronary artery of native heart without angina pectoris    • HERNÁNDEZ (dyspnea on exertion)    • Essential hypertension    • Headache    • Hyperlipidemia    • Hypertension    • Joint pain    • Joint swelling    • Knee pain    • Leg swelling    • Malaise and fatigue    • Moderate mitral regurgitation     by echo 4/2016   • MR (mitral regurgitation)    • Neck pain    • Palpitations    • Pinched nerve in neck 2016   • Precordial pain    • SOB (shortness of breath)        Past Surgical History:   Procedure Laterality Date   • CARDIAC CATHETERIZATION Left 4/28/2016    Procedure: Cardiac catheterization;  Surgeon: Linda Slaughter MD;  Location: [facility] INVASIVE  LOCATION;  Service:    • CARDIAC CATHETERIZATION N/A 4/28/2016    Procedure: Left ventriculography;  Surgeon: Linda Slaughter MD;  Location: [facility] INVASIVE LOCATION;  Service:    • CORONARY ANGIOPLASTY WITH STENT PLACEMENT     • CYST REMOVAL  1997    right testicle, benign   • HERNIA REPAIR     • HYDROCELE EXCISION / REPAIR  1997       Social History     Socioeconomic History   • Marital status:      Spouse name: Not on file   • Number of children: Not on file   • Years of education: Not on file   • Highest education level: Not on file   Tobacco Use   • Smoking status: Never Smoker   • Smokeless tobacco: Never Used   Substance and Sexual Activity   • Alcohol use: No     Alcohol/week: 0.0 standard drinks     Comment: Daily caffeine use   • Drug use: Defer   • Sexual activity: Defer       Family History   Problem Relation Age of Onset   • Heart disease Mother    • Heart disease Father          Current Outpatient Medications:   •  amLODIPine (NORVASC) 2.5 MG tablet, Take 1 tablet by mouth Every 12 (Twelve) Hours., Disp: 180 tablet, Rfl: 1  •  aspirin 81 MG EC tablet, Take 81 mg by mouth daily., Disp: , Rfl:   •  atorvastatin (LIPITOR) 80 MG tablet, Take 1 tablet by mouth daily., Disp: 90 tablet, Rfl: 3  •  butalbital-acetaminophen-caffeine (FIORICET, ESGIC) -40 MG per tablet, Take 1 tablet by mouth As Needed., Disp: , Rfl:   •  clopidogrel (PLAVIX) 75 MG tablet, Take 1 tablet by mouth Daily., Disp: 90 tablet, Rfl: 1  •  cyclobenzaprine (FLEXERIL) 10 MG tablet, Take 10 mg by mouth As Needed., Disp: , Rfl:   •  lisinopril-hydrochlorothiazide (PRINZIDE,ZESTORETIC) 20-25 MG per tablet, Take 1 tablet by mouth Daily., Disp: 90 tablet, Rfl: 3  •  MEGARED OMEGA-3 KRILL  MG capsule, Take  by mouth Daily., Disp: , Rfl:   •  meloxicam (MOBIC) 15 MG tablet, Take 15 mg by mouth As Needed., Disp: , Rfl:   •  Metoprolol Tartrate 75 MG tablet, Take 75 mg by mouth 2 (Two) Times a Day., Disp: 180 tablet, Rfl:  1  •  Omega-3 Fatty Acids (FISH OIL) 1000 MG capsule capsule, Take 1,000 mg by mouth Daily With Breakfast., Disp: , Rfl:   •  pantoprazole (PROTONIX) 40 MG EC tablet, Take 40 mg by mouth Daily., Disp: , Rfl:   •  sertraline (ZOLOFT) 100 MG tablet, Take 100 mg by mouth Daily., Disp: , Rfl:   •  VOLTAREN 1 % gel gel, As Needed., Disp: , Rfl:   •  amoxicillin-clavulanate (Augmentin) 875-125 MG per tablet, Take 1 tablet by mouth 2 (Two) Times a Day for 7 days., Disp: 14 tablet, Rfl: 0  •  fluticasone (Flonase) 50 MCG/ACT nasal spray, 2 sprays into the nostril(s) as directed by provider Daily., Disp: 16 mL, Rfl: 0  •  methylPREDNISolone (MEDROL) 4 MG dose pack, Take as directed on package instructions., Disp: 21 tablet, Rfl: 0      Review of Systems   Constitutional: Negative for chills, diaphoresis, fatigue and fever.   HENT: Positive for congestion, postnasal drip, rhinorrhea, sinus pressure and sneezing. Negative for ear pain, hoarse voice, sore throat and swollen glands.    Respiratory: Positive for cough and wheezing. Negative for chest tightness and shortness of breath.    Gastrointestinal: Negative.    Musculoskeletal: Negative for myalgias and neck pain.   Neurological: Positive for headache.        There were no vitals filed for this visit.    Objective   Physical Exam  Constitutional:       General: He is not in acute distress.     Appearance: Normal appearance. He is not ill-appearing, toxic-appearing or diaphoretic.   HENT:      Head: Normocephalic.      Nose: Congestion and rhinorrhea present.      Right Sinus: Maxillary sinus tenderness and frontal sinus tenderness present.      Left Sinus: Maxillary sinus tenderness and frontal sinus tenderness present.      Comments: Per pt       Mouth/Throat:      Lips: Pink.      Mouth: Mucous membranes are moist.   Pulmonary:      Effort: Pulmonary effort is normal.   Neurological:      Mental Status: He is alert and oriented to person, place, and time.   Psychiatric:          Mood and Affect: Mood normal.         Speech: Speech normal.         Behavior: Behavior normal.          Procedures     Assessment/Plan   Diagnoses and all orders for this visit:    1. Acute non-recurrent sinusitis, unspecified location (Primary)  -     methylPREDNISolone (MEDROL) 4 MG dose pack; Take as directed on package instructions.  Dispense: 21 tablet; Refill: 0  -     fluticasone (Flonase) 50 MCG/ACT nasal spray; 2 sprays into the nostril(s) as directed by provider Daily.  Dispense: 16 mL; Refill: 0  -     amoxicillin-clavulanate (Augmentin) 875-125 MG per tablet; Take 1 tablet by mouth 2 (Two) Times a Day for 7 days.  Dispense: 14 tablet; Refill: 0    2. Allergic rhinitis, unspecified seasonality, unspecified trigger  -     methylPREDNISolone (MEDROL) 4 MG dose pack; Take as directed on package instructions.  Dispense: 21 tablet; Refill: 0  -     fluticasone (Flonase) 50 MCG/ACT nasal spray; 2 sprays into the nostril(s) as directed by provider Daily.  Dispense: 16 mL; Refill: 0    Take medicine as prescribed.   You may continue antihistamine of choice.   Avoid using Sudafed as this may raise your blood pressure, try Flonase instead.   If symptoms worsen or do not improve follow up with your PCP or visit your nearest Urgent Care Center or ER.        PLAN: Discussed dosing, side effects, recommended other symptomatic care.  Patient should follow up with primary care provider if symptoms worsen, fail to resolve or other symptoms need attention. Patient/family agree to the above.     I spent 29 minutes caring for Matthew on this date of service. This time includes time spent by me in the following activities:preparing for the visit, obtaining and/or reviewing a separately obtained history, performing a medically appropriate examination and/or evaluation , counseling and educating the patient/family/caregiver, ordering medications, tests, or procedures and documenting information in the medical  record    Hoa Todd, APRN     This visit was performed via Telehealth.  This patient has been instructed to follow-up with their primary care provider if their symptoms worsen or the treatment provided does not resolve their illness.

## 2021-06-08 NOTE — PATIENT INSTRUCTIONS
Take medicine as prescribed.   You may continue antihistamine of choice.   Avoid using Sudafed as this may raise your blood pressure, try Flonase instead.   If symptoms worsen or do not improve follow up with your PCP or visit your nearest Urgent Care Center or ER.        Sinusitis, Adult  Sinusitis is soreness and swelling (inflammation) of your sinuses. Sinuses are hollow spaces in the bones around your face. They are located:  · Around your eyes.  · In the middle of your forehead.  · Behind your nose.  · In your cheekbones.  Your sinuses and nasal passages are lined with a fluid called mucus. Mucus drains out of your sinuses. Swelling can trap mucus in your sinuses. This lets germs (bacteria, virus, or fungus) grow, which leads to infection. Most of the time, this condition is caused by a virus.  What are the causes?  This condition is caused by:  · Allergies.  · Asthma.  · Germs.  · Things that block your nose or sinuses.  · Growths in the nose (nasal polyps).  · Chemicals or irritants in the air.  · Fungus (rare).  What increases the risk?  You are more likely to develop this condition if:  · You have a weak body defense system (immune system).  · You do a lot of swimming or diving.  · You use nasal sprays too much.  · You smoke.  What are the signs or symptoms?  The main symptoms of this condition are pain and a feeling of pressure around the sinuses. Other symptoms include:  · Stuffy nose (congestion).  · Runny nose (drainage).  · Swelling and warmth in the sinuses.  · Headache.  · Toothache.  · A cough that may get worse at night.  · Mucus that collects in the throat or the back of the nose (postnasal drip).  · Being unable to smell and taste.  · Being very tired (fatigue).  · A fever.  · Sore throat.  · Bad breath.  How is this diagnosed?  This condition is diagnosed based on:  · Your symptoms.  · Your medical history.  · A physical exam.  · Tests to find out if your condition is short-term (acute) or  long-term (chronic). Your doctor may:  ? Check your nose for growths (polyps).  ? Check your sinuses using a tool that has a light (endoscope).  ? Check for allergies or germs.  ? Do imaging tests, such as an MRI or CT scan.  How is this treated?  Treatment for this condition depends on the cause and whether it is short-term or long-term.  · If caused by a virus, your symptoms should go away on their own within 10 days. You may be given medicines to relieve symptoms. They include:  ? Medicines that shrink swollen tissue in the nose.  ? Medicines that treat allergies (antihistamines).  ? A spray that treats swelling of the nostrils.   ? Rinses that help get rid of thick mucus in your nose (nasal saline washes).  · If caused by bacteria, your doctor may wait to see if you will get better without treatment. You may be given antibiotic medicine if you have:  ? A very bad infection.  ? A weak body defense system.  · If caused by growths in the nose, you may need to have surgery.  Follow these instructions at home:  Medicines  · Take, use, or apply over-the-counter and prescription medicines only as told by your doctor. These may include nasal sprays.  · If you were prescribed an antibiotic medicine, take it as told by your doctor. Do not stop taking the antibiotic even if you start to feel better.  Hydrate and humidify    · Drink enough water to keep your pee (urine) pale yellow.  · Use a cool mist humidifier to keep the humidity level in your home above 50%.  · Breathe in steam for 10-15 minutes, 3-4 times a day, or as told by your doctor. You can do this in the bathroom while a hot shower is running.  · Try not to spend time in cool or dry air.  Rest  · Rest as much as you can.  · Sleep with your head raised (elevated).  · Make sure you get enough sleep each night.  General instructions    · Put a warm, moist washcloth on your face 3-4 times a day, or as often as told by your doctor. This will help with  discomfort.  · Wash your hands often with soap and water. If there is no soap and water, use hand .  · Do not smoke. Avoid being around people who are smoking (secondhand smoke).  · Keep all follow-up visits as told by your doctor. This is important.  Contact a doctor if:  · You have a fever.  · Your symptoms get worse.  · Your symptoms do not get better within 10 days.  Get help right away if:  · You have a very bad headache.  · You cannot stop throwing up (vomiting).  · You have very bad pain or swelling around your face or eyes.  · You have trouble seeing.  · You feel confused.  · Your neck is stiff.  · You have trouble breathing.  Summary  · Sinusitis is swelling of your sinuses. Sinuses are hollow spaces in the bones around your face.  · This condition is caused by tissues in your nose that become inflamed or swollen. This traps germs. These can lead to infection.  · If you were prescribed an antibiotic medicine, take it as told by your doctor. Do not stop taking it even if you start to feel better.  · Keep all follow-up visits as told by your doctor. This is important.  This information is not intended to replace advice given to you by your health care provider. Make sure you discuss any questions you have with your health care provider.  Document Revised: 05/20/2019 Document Reviewed: 05/20/2019  [employer] Patient Education © 2021 [employer]  Allergic Rhinitis, Adult    Allergic rhinitis is an allergic reaction that affects the mucous membrane inside the nose. The mucous membrane is the tissue that produces mucus.  There are two types of allergic rhinitis:  · Seasonal. This type is also called hay fever and happens only during certain seasons.  · Perennial. This type can happen at any time of the year.  Allergic rhinitis cannot be spread from person to person. This condition can be mild, moderate, or severe. It can develop at any age and may be outgrown.  What are the causes?  This condition is  caused by allergens. These are things that can cause an allergic reaction. Allergens may differ for seasonal allergic rhinitis and perennial allergic rhinitis.  · Seasonal allergic rhinitis is triggered by pollen. Pollen can come from grasses, trees, and weeds.  · Perennial allergic rhinitis may be triggered by:  ? Dust mites.  ? Proteins in a pet's urine, saliva, or dander. Dander is dead skin cells from a pet.  ? Smoke, mold, or car fumes.  What increases the risk?  You are more likely to develop this condition if you have a family history of allergies or other conditions related to allergies, including:  · Allergic conjunctivitis.This is inflammation of parts of the eyes and eyelids.  · Asthma. This condition affects the lungs and makes it hard to breathe.  · Atopic dermatitis or eczema. This is long term (chronic ) inflammation of the skin.  · Food allergies  What are the signs or symptoms?  Symptoms of this condition include:  · Sneezing or coughing.  · A stuffy nose (nasal congestion), itchy nose, or nasal discharge.  · Itchy eyes and tearing of the eyes.  · A feeling of mucus dripping down the back of your throat (postnasal drip).  · Trouble sleeping.  · Tiredness or fatigue.  · Headache.  · Sore throat.  How is this diagnosed?  This condition may be diagnosed with your symptoms, medical history, and physical exam. Your health care provider may check for related conditions, such as:  · Asthma.  · Pink eye. This is eye inflammation caused by infection (conjunctivitis).  · Ear infection.  · Upper respiratory infection. This is an an infection in the nose, throat, or upper airways.  You may also have tests to find out which allergens trigger your symptoms. These may include skin tests or blood tests.  How is this treated?  There is no cure for this condition, but treatment can help control symptoms. Treatment may include:  · Taking medicines that block allergy symptoms, such as corticosteroids and  antihistamines. Medicine may be given as a shot, nasal spray, or pill.  · Avoiding any allergens.  · Being exposed again and again to tiny amounts of allergens to help you build a defense against allergens (immunotherapy). This is done if other treatments have not helped. It may include:  ? Allergy shots. These are injected medicines that have small amounts of allergen in them.  ? Sublingual immunotherapy. This involves taking small doses of a medicine with allergen in it under your tongue.  If these treatments do not work, your health care provider may prescribe newer, stronger medicines.  Follow these instructions at home:  Avoiding allergens  Find out what you are allergic to and avoid those allergens. These are some things you can do to help avoid allergens:  · If you have perennial allergies:  ? Replace carpet with wood, tile, or vinyl paolo. Carpet can trap dander and dust.  ? Do not smoke. Do not allow smoking in your home.  ? Change your heating and air conditioning filters at least once a month.  · If you have seasonal allergies, take these steps during allergy season:  ? Keep windows closed as much as possible.  ? Plan outdoor activities when pollen counts are lowest. Check pollen counts before you plan outdoor activities.  ? When coming indoors, change clothing and shower before sitting on furniture or bedding.  · If you have a pet in the house that produces allergens:  ? Keep the pet out of the bedroom.  ? Vacuum, sweep, and dust regularly.  General instructions  · Take over-the-counter and prescription medicines only as told by your health care provider.  · Drink enough fluid to keep your urine pale yellow.  · Keep all follow-up visits as told by your health care provider. This is important.  Where to find more information  · American Academy of Allergy, Asthma & Immunology: www.aaaai.org  Contact a health care provider if:  · You have a fever.  · You develop a cough that does not go away.  · You  make whistling sounds when you breathe (wheeze).  · Your symptoms slow you down or stop you from doing your normal activities each day.  Get help right away if:  · You have shortness of breath.  This symptom may represent a serious problem that is an emergency. Do not wait to see if the symptom will go away. Get medical help right away. Call your local emergency services (911 in the [place] Do not drive yourself to the hospital.  Summary  · Allergic rhinitis may be managed by taking medicines as directed and avoiding allergens.  · If you have seasonal allergies, keep windows closed as much as possible during allergy season.  · Contact your health care provider if you develop a fever or a cough that does not go away.  This information is not intended to replace advice given to you by your health care provider. Make sure you discuss any questions you have with your health care provider.  Document Revised: 01/07/2021 Document Reviewed: 12/15/2020  [employer] Patient Education © 2021 [employer]

## 2021-07-15 ENCOUNTER — OFFICE VISIT (OUTPATIENT)
Dept: CARDIOLOGY | Facility: CLINIC | Age: 51
End: 2021-07-15

## 2021-07-15 VITALS
HEART RATE: 57 BPM | HEIGHT: 67 IN | BODY MASS INDEX: 35.47 KG/M2 | WEIGHT: 226 LBS | DIASTOLIC BLOOD PRESSURE: 70 MMHG | SYSTOLIC BLOOD PRESSURE: 120 MMHG

## 2021-07-15 DIAGNOSIS — I10 ESSENTIAL HYPERTENSION: ICD-10-CM

## 2021-07-15 DIAGNOSIS — G47.33 OSA (OBSTRUCTIVE SLEEP APNEA): ICD-10-CM

## 2021-07-15 DIAGNOSIS — I25.10 CORONARY ARTERY DISEASE INVOLVING NATIVE CORONARY ARTERY OF NATIVE HEART WITHOUT ANGINA PECTORIS: Primary | ICD-10-CM

## 2021-07-15 PROCEDURE — 99213 OFFICE O/P EST LOW 20 MIN: CPT | Performed by: INTERNAL MEDICINE

## 2021-07-15 RX ORDER — AMLODIPINE BESYLATE 2.5 MG/1
2.5 TABLET ORAL EVERY 12 HOURS
Qty: 180 TABLET | Refills: 3 | Status: SHIPPED | OUTPATIENT
Start: 2021-07-15 | End: 2023-04-03 | Stop reason: SDUPTHER

## 2021-07-15 RX ORDER — LAMOTRIGINE 25 MG/1
25 TABLET ORAL DAILY
COMMUNITY
End: 2022-09-17 | Stop reason: ALTCHOICE

## 2021-07-15 RX ORDER — METOPROLOL TARTRATE 75 MG/1
75 TABLET, FILM COATED ORAL 2 TIMES DAILY
Qty: 180 TABLET | Refills: 3 | Status: SHIPPED | OUTPATIENT
Start: 2021-07-15

## 2021-07-15 NOTE — PROGRESS NOTES
Date of Office Visit: 07/15/2021  Encounter Provider: Christine Carson MD  Place of Service: [facility]  Patient Name: Matthew Morris  :1970    Chief complaint  Transient cardiomyopathy, coronary artery disease, hypertension    History of Present Illness   Patient is a very pleasant 49-year-old gentleman who was transferred from [facility] on 2016 after chest pain with an elevated troponin consistent with non-ST elevation myocardial infarction.  Echocardiogram revealed an ejection fraction 40% and a catheterization revealed severe disease of the right coronary artery.  He received a drug-coated stent.  He has had intermittent chest pain since then in the setting of hypertension.  He was admitted on  for such an occurrence he ruled out for myocardial infarction.  An echocardiogram revealed his ejection fraction had improved to 50% with mild to moderate mitral regurgitation. 2017 he complained of brief palpitations and chest pain that was atypical.  Ziopatch showed no arrhythmia.  In 2020 as part of preoperative evaluation he underwent a stress echocardiogram that showed a mildly dilated left ventricle with mild left ventricular hypertrophy normal systolic function with an ejection fraction of 60%, no significant valvular heart disease and no ischemia and an excellent workload of 13 METS though hypertensive response to exercise is present.    Seen at [facility] ER in April for chest pain and palpitations that had occurred intermittently for 2 weeks.  Blood pressure slightly elevated and he notes he was slightly stressed.  EKG and blood work was unremarkable.  He has not had recurrent episodes since then.  He is active walking 3 miles an hour and at times at a faster pace on his treadmill.  He has had no chest pain shortness of breath palpitations syncope near syncope.  He has had some edema in his right hand which she is to see Dr. Meredith  about.  He is to have sleep medicine follow-up today.  Blood pressure at home has been as it is today    Blood work dated 4/2021shows hemoglobin 13.6 normal CMP except for glucose of 113, lipid panel on 8/2020 showed an LDL of 70, HDL 40, triglycerides 59    Past Medical History:   Diagnosis Date   • Angina pectoris (CMS/HCC)    • Aortic calcification (CMS/HCC) 2008   • Arthritis    • CAD (coronary artery disease)     NSTEMI 4/2016: cath with 99% RCA s/p 3.5x12mm WENDIE, 30% ostial LCx, normal LM/LAD   • Cervical radiculopathy    • Chest pain    • Coronary artery disease involving native coronary artery of native heart without angina pectoris    • HERNÁNDEZ (dyspnea on exertion)    • Essential hypertension    • Headache    • Hyperlipidemia    • Hypertension    • Joint pain    • Joint swelling    • Knee pain    • Leg swelling    • Malaise and fatigue    • Moderate mitral regurgitation     by echo 4/2016   • Neck pain    • Palpitations    • Pinched nerve in neck 2016   • Precordial pain    • SOB (shortness of breath)      Past Surgical History:   Procedure Laterality Date   • CARDIAC CATHETERIZATION Left 4/28/2016    Procedure: Cardiac catheterization;  Surgeon: Linda Slaughter MD;  Location: [facility] CATH INVASIVE LOCATION;  Service:    • CARDIAC CATHETERIZATION N/A 4/28/2016    Procedure: Left ventriculography;  Surgeon: Linda Slaughter MD;  Location: [facility] CATH INVASIVE LOCATION;  Service:    • CORONARY ANGIOPLASTY WITH STENT PLACEMENT     • CYST REMOVAL  1997    right testicle, benign   • HERNIA REPAIR     • HYDROCELE EXCISION / REPAIR  1997     Outpatient Medications Prior to Visit   Medication Sig Dispense Refill   • aspirin 81 MG EC tablet Take 81 mg by mouth daily.     • atorvastatin (LIPITOR) 80 MG tablet Take 1 tablet by mouth daily. 90 tablet 3   • butalbital-acetaminophen-caffeine (FIORICET, ESGIC) -40 MG per tablet Take 1 tablet by mouth As Needed.     • clopidogrel (PLAVIX) 75 MG tablet Take 1 tablet by  mouth Daily. 90 tablet 1   • cyclobenzaprine (FLEXERIL) 10 MG tablet Take 10 mg by mouth As Needed.     • fluticasone (Flonase) 50 MCG/ACT nasal spray 2 sprays into the nostril(s) as directed by provider Daily. 16 mL 0   • lamoTRIgine (LaMICtal) 25 MG tablet Take 25 mg by mouth Daily.     • lisinopril-hydrochlorothiazide (PRINZIDE,ZESTORETIC) 20-25 MG per tablet Take 1 tablet by mouth Daily. 90 tablet 3   • MEGARED OMEGA-3 KRILL  MG capsule Take  by mouth Daily.     • meloxicam (MOBIC) 15 MG tablet Take 15 mg by mouth As Needed.     • pantoprazole (PROTONIX) 40 MG EC tablet Take 40 mg by mouth Daily.     • VOLTAREN 1 % gel gel As Needed.     • amLODIPine (NORVASC) 2.5 MG tablet Take 1 tablet by mouth Every 12 (Twelve) Hours. 180 tablet 1   • Metoprolol Tartrate 75 MG tablet Take 75 mg by mouth 2 (Two) Times a Day. 180 tablet 1   • methylPREDNISolone (MEDROL) 4 MG dose pack Take as directed on package instructions. 21 tablet 0   • Omega-3 Fatty Acids (FISH OIL) 1000 MG capsule capsule Take 1,000 mg by mouth Daily With Breakfast.     • sertraline (ZOLOFT) 100 MG tablet Take 100 mg by mouth Daily.       No facility-administered medications prior to visit.       Allergies as of 07/15/2021   • (No Known Allergies)     Social History     Socioeconomic History   • Marital status:      Spouse name: Not on file   • Number of children: Not on file   • Years of education: Not on file   • Highest education level: Not on file   Tobacco Use   • Smoking status: Never Smoker   • Smokeless tobacco: Never Used   Substance and Sexual Activity   • Alcohol use: No     Alcohol/week: 0.0 standard drinks     Comment: Daily caffeine use   • Drug use: Never   • Sexual activity: Defer     Family History   Problem Relation Age of Onset   • Heart disease Mother    • Heart disease Father      Review of Systems   Constitutional: Negative for chills, fever, weight gain and weight loss.   Cardiovascular: Negative for leg swelling.  "  Respiratory: Negative for cough, snoring and wheezing.    Hematologic/Lymphatic: Negative for bleeding problem. Does not bruise/bleed easily.   Skin: Negative for color change.   Musculoskeletal: Negative for falls, joint pain and myalgias.   Gastrointestinal: Negative for melena.   Genitourinary: Negative for hematuria.   Neurological: Negative for excessive daytime sleepiness.   Psychiatric/Behavioral: Negative for depression. The patient is not nervous/anxious.         Objective:     Vitals:    07/15/21 0753   BP: 120/70   Pulse: 57   Weight: 103 kg (226 lb)   Height: 170.2 cm (67\")     Body mass index is 35.4 kg/m².    Vitals reviewed.   Constitutional:       Appearance: Well-developed.   Eyes:      General: No scleral icterus.        Right eye: No discharge.      Conjunctiva/sclera: Conjunctivae normal.      Pupils: Pupils are equal, round, and reactive to light.   HENT:      Head: Normocephalic.      Nose: Nose normal.   Neck:      Thyroid: No thyromegaly.      Vascular: No JVD.   Pulmonary:      Effort: Pulmonary effort is normal. No respiratory distress.      Breath sounds: Normal breath sounds. No wheezing. No rales.   Cardiovascular:      Normal rate. Regular rhythm. Normal S1. Normal S2.      Murmurs: There is no murmur.      No gallop.   Pulses:     Intact distal pulses.   Edema:     Peripheral edema absent.   Abdominal:      General: Bowel sounds are normal. There is no distension.      Palpations: Abdomen is soft.      Tenderness: There is no abdominal tenderness. There is no rebound.   Musculoskeletal: Normal range of motion.         General: No tenderness.      Cervical back: Normal range of motion and neck supple. Skin:     General: Skin is warm and dry.      Findings: No erythema or rash.   Neurological:      Mental Status: Alert and oriented to person, place, and time.   Psychiatric:         Behavior: Behavior normal.         Thought Content: Thought content normal.         Judgment: Judgment " normal.       Lab Review:     ECG 12 Lead    Date/Time: 7/25/2021 12:50 PM  Performed by: Christine Carson MD  Authorized by: Christine Carson MD   Comparison: compared with previous ECG   Comparison to previous ECG: ST Elevation in V2 slightly more pronounced  Rhythm: sinus rhythm  Other findings: non-specific ST-T wave changes    Clinical impression: abnormal EKG          Assessment:       Diagnosis Plan   1. Coronary artery disease involving native coronary artery of native heart without angina pectoris  ECG 12 Lead   2. Essential hypertension  amLODIPine (NORVASC) 2.5 MG tablet    ECG 12 Lead   3. EVON (obstructive sleep apnea)  ECG 12 Lead     Plan:       1   Palpitations.  Resolved.  Continue risk factor modification  2.  Chest pain.  Resolved.  Continue risk factor modification follow-up in 6 months with CELI me in 1 year  3.  Edema, resolved  4.  Coronary artery disease, non-ST elevation myocardial infarction, status post drug-coated stent placement.  Negative stress test and excellent workload in 2/2020.  EKG changes nonspecific   5.  Hypertension, controlled  6.  Hyperlipidemia, controlled and repeat labs in 8/2021  7.  Obstructive sleep apnea.  On CPAP therapy.      Time Spent: I spent 20 minutes caring for Matthew on this date of service. This time includes time spent by me in the following activities: preparing for the visit, reviewing tests, obtaining and/or reviewing a separately obtained history, performing a medically appropriate examination and/or evaluation, counseling and educating the patient/family/caregiver, documenting information in the medical record and independently interpreting results and communicating that information with the patient/family/caregiver.   I spent 1 minutes on the separately reported service of ECG. This time is not included in the time used to support the E/M service also reported today.        Your medication list          Accurate as of July 15, 2021 11:59 PM. If you have any  questions, ask your nurse or doctor.            CONTINUE taking these medications      Instructions Last Dose Given Next Dose Due   amLODIPine 2.5 MG tablet  Commonly known as: NORVASC      Take 1 tablet by mouth Every 12 (Twelve) Hours.       aspirin 81 MG EC tablet      Take 81 mg by mouth daily.       atorvastatin 80 MG tablet  Commonly known as: LIPITOR      Take 1 tablet by mouth daily.       butalbital-acetaminophen-caffeine -40 MG per tablet  Commonly known as: FIORICET, ESGIC      Take 1 tablet by mouth As Needed.       clopidogrel 75 MG tablet  Commonly known as: PLAVIX      Take 1 tablet by mouth Daily.       cyclobenzaprine 10 MG tablet  Commonly known as: FLEXERIL      Take 10 mg by mouth As Needed.       fluticasone 50 MCG/ACT nasal spray  Commonly known as: Flonase      2 sprays into the nostril(s) as directed by provider Daily.       lamoTRIgine 25 MG tablet  Commonly known as: LaMICtal      Take 25 mg by mouth Daily.       lisinopril-hydrochlorothiazide 20-25 MG per tablet  Commonly known as: PRINZIDE,ZESTORETIC      Take 1 tablet by mouth Daily.       MegaRed Omega-3 Krill Oil 500 MG capsule      Take  by mouth Daily.       meloxicam 15 MG tablet  Commonly known as: MOBIC      Take 15 mg by mouth As Needed.       Metoprolol Tartrate 75 MG tablet      Take 75 mg by mouth 2 (Two) Times a Day.       pantoprazole 40 MG EC tablet  Commonly known as: PROTONIX      Take 40 mg by mouth Daily.       Voltaren 1 % gel gel  Generic drug: Diclofenac Sodium      As Needed.          STOP taking these medications    methylPREDNISolone 4 MG dose pack  Commonly known as: MEDROL  Stopped by: Christine Carson MD        sertraline 100 MG tablet  Commonly known as: ZOLOFT  Stopped by: Christine Carson MD              Where to Get Your Medications      These medications were sent to [facility] -  [place] - [address] - [phone]  - [phone] [address]  [address] [place] [address]    Phone: [phone]    · amLODIPine 2.5 MG tablet  · Metoprolol Tartrate 75 MG tablet         Patient is no longer taking -.  I corrected the med list to reflect this.  I did not stop these medications.      Dictated utilizing Dragon dictation

## 2021-07-25 PROCEDURE — 93000 ELECTROCARDIOGRAM COMPLETE: CPT | Performed by: INTERNAL MEDICINE

## 2021-08-19 ENCOUNTER — HOSPITAL ENCOUNTER (EMERGENCY)
Facility: HOSPITAL | Age: 51
Discharge: HOME | End: 2021-08-19
Attending: EMERGENCY MEDICINE | Admitting: EMERGENCY MEDICINE

## 2021-08-19 ENCOUNTER — APPOINTMENT (OUTPATIENT)
Dept: GENERAL RADIOLOGY | Facility: HOSPITAL | Age: 51
End: 2021-08-19

## 2021-08-19 VITALS
WEIGHT: 230.38 LBS | TEMPERATURE: 98.9 F | DIASTOLIC BLOOD PRESSURE: 83 MMHG | OXYGEN SATURATION: 98 % | BODY MASS INDEX: 37.03 KG/M2 | SYSTOLIC BLOOD PRESSURE: 155 MMHG | HEIGHT: 66 IN | HEART RATE: 55 BPM | RESPIRATION RATE: 18 BRPM

## 2021-08-19 DIAGNOSIS — M19.042 ARTHRITIS OF LEFT HAND: Primary | ICD-10-CM

## 2021-08-19 PROCEDURE — 73130 X-RAY EXAM OF HAND: CPT

## 2021-08-19 PROCEDURE — 25010000002 DEXAMETHASONE PER 1 MG: Performed by: PHYSICIAN ASSISTANT

## 2021-08-19 PROCEDURE — 96372 THER/PROPH/DIAG INJ SC/IM: CPT

## 2021-08-19 PROCEDURE — 99282 EMERGENCY DEPT VISIT SF MDM: CPT

## 2021-08-19 PROCEDURE — 25010000002 KETOROLAC TROMETHAMINE PER 15 MG: Performed by: PHYSICIAN ASSISTANT

## 2021-08-19 RX ORDER — DEXAMETHASONE SODIUM PHOSPHATE 10 MG/ML
10 INJECTION INTRAMUSCULAR; INTRAVENOUS ONCE
Status: COMPLETED | OUTPATIENT
Start: 2021-08-19 | End: 2021-08-19

## 2021-08-19 RX ORDER — PREDNISONE 50 MG/1
50 TABLET ORAL DAILY
Qty: 5 TABLET | Refills: 0 | Status: SHIPPED | OUTPATIENT
Start: 2021-08-19 | End: 2022-01-07 | Stop reason: ALTCHOICE

## 2021-08-19 RX ORDER — KETOROLAC TROMETHAMINE 30 MG/ML
30 INJECTION, SOLUTION INTRAMUSCULAR; INTRAVENOUS ONCE
Status: COMPLETED | OUTPATIENT
Start: 2021-08-19 | End: 2021-08-19

## 2021-08-19 RX ADMIN — DEXAMETHASONE SODIUM PHOSPHATE 10 MG: 10 INJECTION INTRAMUSCULAR; INTRAVENOUS at 16:49

## 2021-08-19 RX ADMIN — KETOROLAC TROMETHAMINE 30 MG: 30 INJECTION, SOLUTION INTRAMUSCULAR; INTRAVENOUS at 16:49

## 2021-08-19 NOTE — ED PROVIDER NOTES
Subjective     History provided by:  Patient   used: No    Hand Pain  Location:  Left  Severity:  Moderate  Onset quality:  Gradual  Duration:  2 weeks  Timing:  Intermittent  Progression:  Worsening  Chronicity:  New  Context:  No injury, swelling most in thumb, index and third finger  Associated symptoms: no fatigue and no fever        Review of Systems   Constitutional: Negative for appetite change, chills, fatigue and fever.   HENT: Negative.    Eyes: Negative.  Negative for photophobia.   Respiratory: Negative.    Gastrointestinal: Negative.    Endocrine: Negative.    Genitourinary: Negative.    Musculoskeletal: Negative.    Skin: Negative.    Allergic/Immunologic: Negative.  Negative for immunocompromised state.   Neurological: Negative.    Hematological: Negative.    Psychiatric/Behavioral: Negative.    All other systems reviewed and are negative.      Past Medical History:   Diagnosis Date   • Angina pectoris (CMS/HCC)    • Aortic calcification (CMS/HCC) 2008   • Arthritis    • CAD (coronary artery disease)     NSTEMI 4/2016: cath with 99% RCA s/p 3.5x12mm WENDIE, 30% ostial LCx, normal LM/LAD   • Cervical radiculopathy    • Chest pain    • Coronary artery disease involving native coronary artery of native heart without angina pectoris    • HERNÁNDEZ (dyspnea on exertion)    • Essential hypertension    • Headache    • Hyperlipidemia    • Hypertension    • Joint pain    • Joint swelling    • Knee pain    • Leg swelling    • Malaise and fatigue    • Moderate mitral regurgitation     by echo 4/2016   • Neck pain    • Palpitations    • Pinched nerve in neck 2016   • Precordial pain    • SOB (shortness of breath)        No Known Allergies    Past Surgical History:   Procedure Laterality Date   • CARDIAC CATHETERIZATION Left 4/28/2016    Procedure: Cardiac catheterization;  Surgeon: Linda Slaughter MD;  Location: [facility] INVASIVE LOCATION;  Service:    • CARDIAC CATHETERIZATION N/A 4/28/2016     Procedure: Left ventriculography;  Surgeon: Linda Slaughter MD;  Location: [facility] INVASIVE LOCATION;  Service:    • CORONARY ANGIOPLASTY WITH STENT PLACEMENT     • CYST REMOVAL  1997    right testicle, benign   • HERNIA REPAIR     • HYDROCELE EXCISION / REPAIR  1997       Family History   Problem Relation Age of Onset   • Heart disease Mother    • Heart disease Father        Social History     Socioeconomic History   • Marital status:      Spouse name: Not on file   • Number of children: Not on file   • Years of education: Not on file   • Highest education level: Not on file   Tobacco Use   • Smoking status: Never Smoker   • Smokeless tobacco: Never Used   Substance and Sexual Activity   • Alcohol use: No     Alcohol/week: 0.0 standard drinks     Comment: Daily caffeine use   • Drug use: Never   • Sexual activity: Defer           Objective   Physical Exam  Vitals and nursing note reviewed.   Constitutional:       General: He is not in acute distress.     Appearance: Normal appearance. He is not ill-appearing or toxic-appearing.   HENT:      Head: Normocephalic and atraumatic.   Pulmonary:      Effort: Pulmonary effort is normal.   Musculoskeletal:      Comments: Swelling to left thumb, index finger, middle finger  NV in tact  No erythema   Neurological:      Mental Status: He is alert and oriented to person, place, and time. Mental status is at baseline.   Psychiatric:         Mood and Affect: Mood normal.         Behavior: Behavior normal.         Thought Content: Thought content normal.         Judgment: Judgment normal.             MDM  Number of Diagnoses or Management Options  Arthritis of left hand  Diagnosis management comments: Pt is a 51 y.o. male that presents today with Patient presents with:  Hand Injury: injury two weeks ago        Work up today:  Lab Results (last 24 hours)     ** No results found for the last 24 hours. **      Xray reviewed.   No results found.   @No orders to display      Pt is otherwise non toxic and non ill appearing and stable for d/c home.  Pt is in agreement with this.  All questions answered at bedside.          Amount and/or Complexity of Data Reviewed  Tests in the radiology section of CPT®: reviewed  Independent visualization of images, tracings, or specimens: yes    Risk of Complications, Morbidity, and/or Mortality  Presenting problems: low  Diagnostic procedures: low  Management options: low    Patient Progress  Patient progress: stable      Final diagnoses:   Arthritis of left hand       ED Disposition  ED Disposition     ED Disposition Condition Comment    Discharge Stable           Hien Blood MD  [address] [address]  [phone]    Schedule an appointment as soon as possible for a visit in 1 week  for further eval         Medication List      New Prescriptions    predniSONE 50 MG tablet  Commonly known as: DELTASONE  Take 1 tablet by mouth Daily.           Where to Get Your Medications      You can get these medications from any pharmacy    Bring a paper prescription for each of these medications  · predniSONE 50 MG tablet          Ramin Dubois PA  08/19/21 1614           Ramin Dubois PA  08/29/21 8517

## 2021-09-03 ENCOUNTER — TELEPHONE (OUTPATIENT)
Dept: CARDIOLOGY | Facility: CLINIC | Age: 51
End: 2021-09-03

## 2021-09-03 NOTE — TELEPHONE ENCOUNTER
----- Message from Matthew Morris sent at 9/3/2021  8:31 AM EDT -----  Regarding: Test Results Question  Contact: [phone]  [facility]

## 2021-09-03 NOTE — TELEPHONE ENCOUNTER
Please let patient know that his LDL cholesterol has increased about 17 points in the last year.  We really need this less than 70.  Would have him discuss further with PCP.  Would see if he can increase his exercise levels and make some healthy diet changes and follow back up with primary care to get this rechecked.  If not then may need to add additional cholesterol medications on top of the statin he is already taking through PCP.

## 2021-12-21 PROCEDURE — U0004 COV-19 TEST NON-CDC HGH THRU: HCPCS | Performed by: EMERGENCY MEDICINE

## 2022-01-07 PROCEDURE — U0004 COV-19 TEST NON-CDC HGH THRU: HCPCS | Performed by: NURSE PRACTITIONER

## 2022-01-08 ENCOUNTER — TELEPHONE (OUTPATIENT)
Dept: URGENT CARE | Facility: CLINIC | Age: 52
End: 2022-01-08

## 2022-01-09 ENCOUNTER — TELEPHONE (OUTPATIENT)
Dept: URGENT CARE | Facility: CLINIC | Age: 52
End: 2022-01-09

## 2022-01-10 ENCOUNTER — TELEPHONE (OUTPATIENT)
Dept: URGENT CARE | Facility: CLINIC | Age: 52
End: 2022-01-10

## 2022-01-10 DIAGNOSIS — U07.1 COVID-19: Primary | ICD-10-CM

## 2022-01-10 NOTE — TELEPHONE ENCOUNTER
Attempted to call patient to notify them of positive Covid test result.  Patient did not answer.  Left voicemail expressing to patient that we had test results to share with them and for the  patient to return call to clinic as soon as possible. Will send certified letter notifying patient of positive lab results and attempts to call.

## 2022-01-12 ENCOUNTER — TELEMEDICINE (OUTPATIENT)
Dept: FAMILY MEDICINE CLINIC | Facility: TELEHEALTH | Age: 52
End: 2022-01-12

## 2022-01-12 VITALS — HEIGHT: 66 IN | TEMPERATURE: 99.2 F | WEIGHT: 236 LBS | BODY MASS INDEX: 37.93 KG/M2

## 2022-01-12 DIAGNOSIS — J20.8 ACUTE BRONCHITIS DUE TO COVID-19 VIRUS: Primary | ICD-10-CM

## 2022-01-12 DIAGNOSIS — U07.1 ACUTE BRONCHITIS DUE TO COVID-19 VIRUS: Primary | ICD-10-CM

## 2022-01-12 PROCEDURE — 99213 OFFICE O/P EST LOW 20 MIN: CPT | Performed by: NURSE PRACTITIONER

## 2022-01-12 RX ORDER — AZITHROMYCIN 250 MG/1
TABLET, FILM COATED ORAL
Qty: 6 TABLET | Refills: 0 | Status: SHIPPED | OUTPATIENT
Start: 2022-01-12 | End: 2022-08-25 | Stop reason: ALTCHOICE

## 2022-01-12 RX ORDER — DEXTROMETHORPHAN HYDROBROMIDE AND PROMETHAZINE HYDROCHLORIDE 15; 6.25 MG/5ML; MG/5ML
5 SYRUP ORAL NIGHTLY PRN
Qty: 118 ML | Refills: 0 | Status: SHIPPED | OUTPATIENT
Start: 2022-01-12 | End: 2022-08-25 | Stop reason: ALTCHOICE

## 2022-01-12 RX ORDER — PREDNISONE 20 MG/1
TABLET ORAL
Qty: 13 TABLET | Refills: 0 | Status: SHIPPED | OUTPATIENT
Start: 2022-01-12 | End: 2022-08-25 | Stop reason: ALTCHOICE

## 2022-01-12 RX ORDER — GUAIFENESIN 600 MG/1
600 TABLET, EXTENDED RELEASE ORAL 2 TIMES DAILY
Qty: 28 TABLET | Refills: 0 | Status: SHIPPED | OUTPATIENT
Start: 2022-01-12 | End: 2022-01-26

## 2022-01-12 NOTE — PATIENT INSTRUCTIONS
Acute Bronchitis, Adult    Acute bronchitis is sudden or acute swelling of the air tubes (bronchi) in the lungs. Acute bronchitis causes these tubes to fill with mucus, which can make it hard to breathe. It can also cause coughing or wheezing.  In adults, acute bronchitis usually goes away within 2 weeks. A cough caused by bronchitis may last up to 3 weeks. Smoking, allergies, and asthma can make the condition worse.  What are the causes?  This condition can be caused by germs and by substances that irritate the lungs, including:  · Cold and flu viruses. The most common cause of this condition is the virus that causes the common cold.  · Bacteria.  · Substances that irritate the lungs, including:  ? Smoke from cigarettes and other forms of tobacco.  ? Dust and pollen.  ? Fumes from chemical products, gases, or burned fuel.  ? Other materials that pollute indoor or outdoor air.  · Close contact with someone who has acute bronchitis.  What increases the risk?  The following factors may make you more likely to develop this condition:  · A weak body's defense system, also called the immune system.  · A condition that affects your lungs and breathing, such as asthma.  What are the signs or symptoms?  Common symptoms of this condition include:  · Lung and breathing problems, such as:  ? Coughing. This may bring up clear, yellow, or green mucus from your lungs (sputum).  ? Wheezing.  ? Having too much mucus in your lungs (chest congestion).  ? Having shortness of breath.  · A fever.  · Chills.  · Aches and pains, including:  ? Tightness in your chest and other body aches.  ? A sore throat.  How is this diagnosed?  This condition is usually diagnosed based on:  · Your symptoms and medical history.  · A physical exam.  You may also have other tests, including tests to rule out other conditions, such pneumonia. These tests include:  · A test of lung function.  · Test of a mucus sample to look for the presence of  bacteria.  · Tests to check the oxygen level in your blood.  · Blood tests.  · Chest X-ray.  How is this treated?  Most cases of acute bronchitis clear up over time without treatment. Your health care provider may recommend:  · Drinking more fluids. This can thin your mucus, which may improve your breathing.  · Taking a medicine for a fever or cough.  · Using a device that gets medicine into your lungs (inhaler) to help improve breathing and control coughing.  · Using a vaporizer or a humidifier. These are machines that add water to the air to help you breathe better.  Follow these instructions at home:  Activity  · Get plenty of rest.  · Return to your normal activities as told by your health care provider. Ask your health care provider what activities are safe for you.  Lifestyle  · Drink enough fluid to keep your urine pale yellow.  · Do not drink alcohol.  · Do not use any products that contain nicotine or tobacco, such as cigarettes, e-cigarettes, and chewing tobacco. If you need help quitting, ask your health care provider. Be aware that:  ? Your bronchitis will get worse if you smoke or breathe in other people's smoke (secondhand smoke).  ? Your lungs will heal faster if you quit smoking.  General instructions    · Take over-the-counter and prescription medicines only as told by your health care provider.  · Use an inhaler, vaporizer, or humidifier as told by your health care provider.  · If you have a sore throat, gargle with a salt-water mixture 3-4 times a day or as needed. To make a salt-water mixture, completely dissolve ½-1 tsp (3-6 g) of salt in 1 cup (237 mL) of warm water.  · Keep all follow-up visits as told by your health care provider. This is important.    How is this prevented?  To lower your risk of getting this condition again:  · Wash your hands often with soap and water. If soap and water are not available, use hand .  · Avoid contact with people who have cold symptoms.  · Try not  to touch your mouth, nose, or eyes with your hands.  · Avoid places where there are fumes from chemicals. Breathing these fumes will make your condition worse.  · Get the flu shot every year.  Contact a health care provider if:  · Your symptoms do not improve after 2 weeks of treatment.  · You vomit more than once or twice.  · You have symptoms of dehydration such as:  ? Dark urine.  ? Dry skin or eyes.  ? Increased thirst.  ? Headaches.  ? Confusion.  ? Muscle cramps.  Get help right away if you:  · Cough up blood.  · Feel pain in your chest.  · Have severe shortness of breath.  · Faint or keep feeling like you are going to faint.  · Have a severe headache.  · Have fever or chills that get worse.  These symptoms may represent a serious problem that is an emergency. Do not wait to see if the symptoms will go away. Get medical help right away. Call your local emergency services (911 in the [place] Do not drive yourself to the hospital.  Summary  · Acute bronchitis is sudden (acute) inflammation of the air tubes (bronchi) between the windpipe and the lungs. In adults, acute bronchitis usually goes away within 2 weeks, although coughing may last 3 weeks or longer  · Take over-the-counter and prescription medicines only as told by your health care provider.  · Drink enough fluid to keep your urine pale yellow.  · Contact a health care provider if your symptoms do not improve after 2 weeks of treatment.  · Get help right away if you cough up blood, faint, or have chest pain or shortness of breath.  This information is not intended to replace advice given to you by your health care provider. Make sure you discuss any questions you have with your health care provider.  Document Revised: 08/31/2020 Document Reviewed: 07/10/2020  [employer] Patient Education © 2021 [employer]    10 Things You Can Do to Manage Your COVID-19 Symptoms at Home  If you have possible or confirmed COVID-19:  1. Stay home except to get medical  care.  2. Monitor your symptoms carefully. If your symptoms get worse, call your healthcare provider immediately.  3. Get rest and stay hydrated.  4. If you have a medical appointment, call the healthcare provider ahead of time and tell them that you have or may have COVID-19.  5. For medical emergencies, call 911 and notify the dispatch personnel that you have or may have COVID-19.  6. Cover your cough and sneezes with a tissue or use the inside of your elbow.  7. Wash your hands often with soap and water for at least 20 seconds or clean your hands with an alcohol-based hand  that contains at least 60% alcohol.  8. As much as possible, stay in a specific room and away from other people in your home. Also, you should use a separate bathroom, if available. If you need to be around other people in or outside of the home, wear a mask.  9. Avoid sharing personal items with other people in your household, like dishes, towels, and bedding.  10. Clean all surfaces that are touched often, like counters, tabletops, and doorknobs. Use household cleaning sprays or wipes according to the label instructions.  cdc.gov/coronavirus  07/16/2021  This information is not intended to replace advice given to you by your health care provider. Make sure you discuss any questions you have with your health care provider.  Document Revised: 08/04/2021 Document Reviewed: 08/04/2021  [employer] Patient Education © 2021 [employer]

## 2022-01-12 NOTE — PROGRESS NOTES
You have chosen to receive care through a telehealth visit.  Do you consent to use a video/audio connection for your medical care today? Yes     CHIEF COMPLAINT  Cc: covid infection    HPI  Matthew Morris is a 51 y.o. male  presents with complaint of corona virus. He reports that he is coughing and has persistent wheezing in the upper part of his chest. He was diagnosed with coronavirus on  01/07/2022 which is the date that his symptoms began. His temperature has been as high as 102.. Other symptoms include, mild nasal congestion, sinus pain and pressure and chest tightness. The only thing that he has taken for his symptoms is Tylenol. 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 He is vaccinated via 3 doses of the Moderna vaccine.    Review of Systems   Constitutional: Positive for fever. Negative for fatigue.   HENT: Positive for congestion (mild), rhinorrhea (resolved right now), sinus pressure and sinus pain. Negative for sneezing, sore throat and tinnitus.         No loss of taste and smell   Respiratory: Positive for cough, chest tightness and wheezing. Negative for shortness of breath.    Cardiovascular: Negative for chest pain.   Gastrointestinal: Negative for diarrhea, nausea and vomiting.   Musculoskeletal: Negative for myalgias.   Neurological: Negative for headaches.       Past Medical History:   Diagnosis Date   • Angina pectoris (HCC)    • Aortic calcification (HCC) 2008   • Arthritis    • CAD (coronary artery disease)     NSTEMI 4/2016: cath with 99% RCA s/p 3.5x12mm WENDIE, 30% ostial LCx, normal LM/LAD   • Cervical radiculopathy    • Chest pain    • Coronary artery disease involving native coronary artery of native heart without angina pectoris    • HERNÁNDEZ (dyspnea on exertion)    • Essential hypertension    • Headache    • Hyperlipidemia    • Hypertension    • Joint pain    • Joint swelling    • Knee pain    • Leg swelling    • Malaise and fatigue    • Moderate mitral regurgitation      "by echo 4/2016   • Neck pain    • Palpitations    • Pinched nerve in neck 2016   • Precordial pain    • SOB (shortness of breath)        Family History   Problem Relation Age of Onset   • Heart disease Mother    • Heart disease Father        Social History     Socioeconomic History   • Marital status:    Tobacco Use   • Smoking status: Never Smoker   • Smokeless tobacco: Never Used   Substance and Sexual Activity   • Alcohol use: No     Alcohol/week: 0.0 standard drinks     Comment: Daily caffeine use   • Drug use: Never   • Sexual activity: Defer         Temp 99.2 °F (37.3 °C)   Ht 167.6 cm (66\")   Wt 107 kg (236 lb)   BMI 38.09 kg/m²     PHYSICAL EXAM  Physical Exam   Constitutional: He is oriented to person, place, and time. He appears well-developed and well-nourished.   HENT:   Head: Normocephalic and atraumatic.   Right Ear: External ear normal.   Left Ear: External ear normal.   Nose: Congestion present. Right sinus exhibits no maxillary sinus tenderness (patient directed exam) and no frontal sinus tenderness (patient directed exam). Left sinus exhibits no maxillary sinus tenderness (patient directed exam) and no frontal sinus tenderness (patient directed exam).   Mouth/Throat: Mouth/Lips are normal.  Eyes: Lids are normal. Right eye exhibits no discharge and no exudate. Left eye exhibits no discharge and no exudate. Right conjunctiva is not injected. Left conjunctiva is not injected.   Pulmonary/Chest: No accessory muscle usage. No tachypnea and no bradypnea.  No respiratory distress (deep congested cough at visit).No use of oxygen by nasal cannulaNo use of oxygen by mask noted.  Abdominal: Abdomen appears normal.   Neurological: He is alert and oriented to person, place, and time. No cranial nerve deficit.   Skin: His skin appears normal.  Psychiatric: He has a normal mood and affect. His speech is normal and behavior is normal. Judgment and thought content normal.       Results for orders placed " or performed during the hospital encounter of 01/07/22   COVID-19,APTIMA PANTHER(KATERINA), EMILY/ AARON, NP/OP SWAB IN UTM/VTM/SALINE TRANSPORT MEDIA,24 HR TAT - Swab, Nasal Cavity    Specimen: Nasal Cavity; Swab   Result Value Ref Range    COVID19 Detected (C) Not Detected - Ref. Range       Diagnoses and all orders for this visit:    1. Acute bronchitis due to COVID-19 virus (Primary)  -     guaiFENesin (Mucinex) 600 MG 12 hr tablet; Take 1 tablet by mouth 2 (Two) Times a Day for 14 days.  Dispense: 28 tablet; Refill: 0    Other orders  -     predniSONE (DELTASONE) 20 MG tablet; Prednisone 20mg tabs, 3 for 2 days, 2 for 2 days, 1 for 2 days, 1/2 for 2 days take with food or milk  Dispense: 13 tablet; Refill: 0  -     azithromycin (Zithromax) 250 MG tablet; Take 2 tablets the first day, then 1 tablet daily for 4 days.  Dispense: 6 tablet; Refill: 0  -     promethazine-dextromethorphan (PROMETHAZINE-DM) 6.25-15 MG/5ML syrup; Take 5 mL by mouth At Night As Needed for Cough.  Dispense: 118 mL; Refill: 0    Mucinex with plenty of fluids especially water to thin secretions and help with congestion.Take prednisone with food as early in the day as possible  Do not take prednisone with nsaids such as ibuprofen, aleve, or aspirin  May take tylenol for pain or fever  Probiotics for two weeks related to taking antibiotics. The pharmacist can help you with this if needed. Do not take within two hours of antibiotic  Hydrate well  May take vitamins C, D and Zinc  Alternate rest and mild exercise such as walkning    If you have tested positive for COVID-19 and have symptoms isolate for 10 days from onset of symptoms.  If symptoms fully resolve, isolation may be shortened and end after day 5 on the first day without symptoms.  Wear a well-fitting face mask for 10 full days since the start of symptoms. Isolation should not be shortened if amask cannot be worn properly and  consistently.    FOLLOW-UP  If symptoms worsen or persist  follow up with PCP,Virtual Care, Urgent Care or ER dependnent on severity of symptoms    Patient verbalizes understanding of medication dosage, comfort measures, instructions for treatment and follow-up.    Claribel Noland, CELI  01/12/2022  09:08 EST    This visit was performed via Telehealth.  This patient has been instructed to follow-up with their primary care provider if their symptoms worsen or the treatment provided does not resolve their illness.

## 2022-01-15 ENCOUNTER — E-VISIT (OUTPATIENT)
Dept: FAMILY MEDICINE CLINIC | Facility: TELEHEALTH | Age: 52
End: 2022-01-15

## 2022-01-15 ENCOUNTER — TELEMEDICINE (OUTPATIENT)
Dept: FAMILY MEDICINE CLINIC | Facility: TELEHEALTH | Age: 52
End: 2022-01-15

## 2022-01-15 DIAGNOSIS — J40 BRONCHITIS DUE TO COVID-19 VIRUS: Primary | ICD-10-CM

## 2022-01-15 DIAGNOSIS — U07.1 BRONCHITIS DUE TO COVID-19 VIRUS: Primary | ICD-10-CM

## 2022-01-15 PROCEDURE — 99213 OFFICE O/P EST LOW 20 MIN: CPT | Performed by: NURSE PRACTITIONER

## 2022-01-15 RX ORDER — ALBUTEROL SULFATE 90 UG/1
2 AEROSOL, METERED RESPIRATORY (INHALATION) EVERY 4 HOURS PRN
Qty: 6.7 G | Refills: 0 | Status: SHIPPED | OUTPATIENT
Start: 2022-01-15 | End: 2022-09-17

## 2022-01-15 RX ORDER — BENZONATATE 200 MG/1
200 CAPSULE ORAL 3 TIMES DAILY PRN
Qty: 28 CAPSULE | Refills: 0 | Status: SHIPPED | OUTPATIENT
Start: 2022-01-15 | End: 2022-08-25 | Stop reason: ALTCHOICE

## 2022-01-15 NOTE — PROGRESS NOTES
Subjective   Chief Complaint   Patient presents with   • Cough       Matthew Morris is a 51 y.o. male.     Pt tested positive for COVID 8 days ago and URI symptoms began 9 days ago. He has had an ongoing cough that is so bad is causes gagging and shortness of breath. Cough comes in spells and he has associated resp tightness with it. He was seen for these symptoms on 1/12/2022 and was prescribed a zpack, prednisone, prometh-DM and mucinex. He states he feels worse since he has been taking these medicines.     Cough  This is a new problem. Episode onset: 9 days. The problem has been gradually worsening. The problem occurs constantly. The cough is non-productive. Pertinent negatives include no chest pain, chills, ear congestion, ear pain, fever (low grade 99.4), headaches, heartburn, hemoptysis, myalgias, nasal congestion, postnasal drip, rash, rhinorrhea, sore throat, shortness of breath, sweats, weight loss or wheezing. Exacerbated by: talking. Treatments tried: zpack, prednisone, cough medicine, mucinex. The treatment provided no relief.        No Known Allergies    Past Medical History:   Diagnosis Date   • Angina pectoris (HCC)    • Aortic calcification (HCC) 2008   • Arthritis    • CAD (coronary artery disease)     NSTEMI 4/2016: cath with 99% RCA s/p 3.5x12mm WENDIE, 30% ostial LCx, normal LM/LAD   • Cervical radiculopathy    • Chest pain    • Coronary artery disease involving native coronary artery of native heart without angina pectoris    • HERNÁNDEZ (dyspnea on exertion)    • Essential hypertension    • Headache    • Hyperlipidemia    • Hypertension    • Joint pain    • Joint swelling    • Knee pain    • Leg swelling    • Malaise and fatigue    • Moderate mitral regurgitation     by echo 4/2016   • Neck pain    • Palpitations    • Pinched nerve in neck 2016   • Precordial pain    • SOB (shortness of breath)        Past Surgical History:   Procedure Laterality Date   • CARDIAC CATHETERIZATION Left 4/28/2016     Procedure: Cardiac catheterization;  Surgeon: Linda Slaughter MD;  Location:  [facility];  Service:    • CARDIAC CATHETERIZATION N/A 4/28/2016    Procedure: Left ventriculography;  Surgeon: Linda Slaughter MD;  Location:  [facility];  Service:    • CORONARY ANGIOPLASTY WITH STENT PLACEMENT     • CYST REMOVAL  1997    right testicle, benign   • HERNIA REPAIR     • HYDROCELE EXCISION / REPAIR  1997       Social History     Socioeconomic History   • Marital status:    Tobacco Use   • Smoking status: Never Smoker   • Smokeless tobacco: Never Used   Substance and Sexual Activity   • Alcohol use: No     Alcohol/week: 0.0 standard drinks     Comment: Daily caffeine use   • Drug use: Never   • Sexual activity: Defer       Family History   Problem Relation Age of Onset   • Heart disease Mother    • Heart disease Father          Current Outpatient Medications:   •  albuterol sulfate  (90 Base) MCG/ACT inhaler, Inhale 2 puffs Every 4 (Four) Hours As Needed for Wheezing or Shortness of Air., Disp: 6.7 g, Rfl: 0  •  amLODIPine (NORVASC) 2.5 MG tablet, Take 1 tablet by mouth Every 12 (Twelve) Hours., Disp: 180 tablet, Rfl: 3  •  aspirin 81 MG EC tablet, Take 81 mg by mouth daily., Disp: , Rfl:   •  atorvastatin (LIPITOR) 80 MG tablet, Take 1 tablet by mouth daily., Disp: 90 tablet, Rfl: 3  •  azithromycin (Zithromax) 250 MG tablet, Take 2 tablets the first day, then 1 tablet daily for 4 days., Disp: 6 tablet, Rfl: 0  •  benzonatate (TESSALON) 200 MG capsule, Take 1 capsule by mouth 3 (Three) Times a Day As Needed for Cough., Disp: 28 capsule, Rfl: 0  •  butalbital-acetaminophen-caffeine (FIORICET, ESGIC) -40 MG per tablet, Take 1 tablet by mouth As Needed., Disp: , Rfl:   •  clopidogrel (PLAVIX) 75 MG tablet, Take 1 tablet by mouth Daily., Disp: 90 tablet, Rfl: 1  •  cyclobenzaprine (FLEXERIL) 10 MG tablet, Take 10 mg by mouth As Needed., Disp: , Rfl:   •  fluticasone  (Flonase) 50 MCG/ACT nasal spray, 2 sprays into the nostril(s) as directed by provider Daily., Disp: 16 mL, Rfl: 0  •  guaiFENesin (Mucinex) 600 MG 12 hr tablet, Take 1 tablet by mouth 2 (Two) Times a Day for 14 days., Disp: 28 tablet, Rfl: 0  •  lamoTRIgine (LaMICtal) 25 MG tablet, Take 25 mg by mouth Daily., Disp: , Rfl:   •  lisinopril-hydrochlorothiazide (PRINZIDE,ZESTORETIC) 20-25 MG per tablet, Take 1 tablet by mouth Daily., Disp: 90 tablet, Rfl: 3  •  MEGARED OMEGA-3 KRILL  MG capsule, Take  by mouth Daily., Disp: , Rfl:   •  Metoprolol Tartrate 75 MG tablet, Take 75 mg by mouth 2 (Two) Times a Day., Disp: 180 tablet, Rfl: 3  •  pantoprazole (PROTONIX) 40 MG EC tablet, Take 40 mg by mouth Daily., Disp: , Rfl:   •  predniSONE (DELTASONE) 20 MG tablet, Prednisone 20mg tabs, 3 for 2 days, 2 for 2 days, 1 for 2 days, 1/2 for 2 days take with food or milk, Disp: 13 tablet, Rfl: 0  •  promethazine-dextromethorphan (PROMETHAZINE-DM) 6.25-15 MG/5ML syrup, Take 5 mL by mouth At Night As Needed for Cough., Disp: 118 mL, Rfl: 0  •  VOLTAREN 1 % gel gel, As Needed., Disp: , Rfl:       Review of Systems   Constitutional: Positive for fatigue. Negative for chills, diaphoresis, fever (low grade 99.4) and unexpected weight loss.   HENT: Negative for ear pain, postnasal drip, rhinorrhea and sore throat.    Respiratory: Positive for cough and chest tightness. Negative for hemoptysis, shortness of breath and wheezing.    Cardiovascular: Negative for chest pain.   Gastrointestinal: Negative.    Musculoskeletal: Negative for myalgias.   Skin: Negative for rash.   Neurological: Negative for headache.        There were no vitals filed for this visit.    Objective   Physical Exam  Constitutional:       General: He is not in acute distress.     Appearance: Normal appearance. He is not ill-appearing, toxic-appearing or diaphoretic.   HENT:      Head: Normocephalic.      Mouth/Throat:      Lips: Pink.      Mouth: Mucous  membranes are moist.   Pulmonary:      Effort: Pulmonary effort is normal.      Comments: Very frequent coughing, associated with talking.   Neurological:      Mental Status: He is alert and oriented to person, place, and time.   Psychiatric:         Mood and Affect: Mood normal.         Behavior: Behavior normal.          Procedures     Assessment/Plan   Diagnoses and all orders for this visit:    1. Bronchitis due to COVID-19 virus (Primary)  -     benzonatate (TESSALON) 200 MG capsule; Take 1 capsule by mouth 3 (Three) Times a Day As Needed for Cough.  Dispense: 28 capsule; Refill: 0  -     albuterol sulfate  (90 Base) MCG/ACT inhaler; Inhale 2 puffs Every 4 (Four) Hours As Needed for Wheezing or Shortness of Air.  Dispense: 6.7 g; Refill: 0    Continue previously prescribed medicine.   Increase fluids, warm liquids with honey my help or humidfication.   Warning signs for COVID-19: trouble breathing, increasing shortness of breath, persistent chest pain or pressure, new confusion, inability to stay awake, pale, gray or blue-colored skin, lips or nail beds. If you show any of these signs seek Emergency Care.   If symptoms worsen or do not improve follow up with your PCP or visit your nearest Urgent Care Center or ER.    COVID QUARANTINE GUIDELINES:    You should Quarantine while you are waiting for your results.     Positive COVID result:  Isolate for 10 days from the date your symptoms began.  If symptoms fully resolve, you may end Isolation after 5 days from the onset of symptoms and wear a well-fitted mask for the additional 5 days.   If you can not wear a well-fitted mask AT ALL TIMES, you must remain in isolation for a full 10 days from the onset of symptoms.     If you have a Positive COVID result and NEVER had symptoms:  Isolate for 5 days from the date you had a positive test.   Wear a well-fitted mask for an additional 5 days.   If you can not wear a well fitted mask AT ALL TIMES, you must remain  in isolation for the full 10 days from the onset of symptoms.       If you are NOT fully Vaccinated or had a Booster shot if eligible, but have had COVID EXPOSURE:  Quarantine for 10 days from the last day of exposure  Quarantine may be shortened if you have no symptoms and test Negative on day 5 post exposure.   Wear a well-fitted mask for 10 days from the last day of exposure.  If symptoms develop, stay home and get re-tested.     If HAVE been fully Vaccinated and had a Booster shot if eligible, but have had COVID EXPOSURE and have No Symptoms:  You do NOT need to quarantine  Wear a well-fitted mask for 10 days from the last day of exposure.  Get a COVID test on day 5.  If symptoms develop, stay home and get re-tested.           Results for orders placed or performed during the hospital encounter of 01/07/22   COVID-19,APTIMA PANTHER(KATERINA),[facility], NP/OP SWAB IN UTM/VTM/SALINE TRANSPORT MEDIA,24 HR TAT - Swab, Nasal Cavity    Specimen: Nasal Cavity; Swab   Result Value Ref Range    COVID19 Detected (C) Not Detected - Ref. Range       PLAN: Discussed dosing, side effects, recommended other symptomatic care.  Patient should follow up with primary care provider if symptoms worsen, fail to resolve or other symptoms need attention. Patient/family agree to the above.         CELI Whimtore     This visit was performed via Telehealth.  This patient has been instructed to follow-up with their primary care provider if their symptoms worsen or the treatment provided does not resolve their illness.

## 2022-01-15 NOTE — PATIENT INSTRUCTIONS
Continue previously prescribed medicine.   Increase fluids, warm liquids with honey my help or humidfication.   Warning signs for COVID-19: trouble breathing, increasing shortness of breath, persistent chest pain or pressure, new confusion, inability to stay awake, pale, gray or blue-colored skin, lips or nail beds. If you show any of these signs seek Emergency Care.   If symptoms worsen or do not improve follow up with your PCP or visit your nearest Urgent Care Center or ER.    COVID QUARANTINE GUIDELINES:    You should Quarantine while you are waiting for your results.     Positive COVID result:  Isolate for 10 days from the date your symptoms began.  If symptoms fully resolve, you may end Isolation after 5 days from the onset of symptoms and wear a well-fitted mask for the additional 5 days.   If you can not wear a well-fitted mask AT ALL TIMES, you must remain in isolation for a full 10 days from the onset of symptoms.     If you have a Positive COVID result and NEVER had symptoms:  Isolate for 5 days from the date you had a positive test.   Wear a well-fitted mask for an additional 5 days.   If you can not wear a well fitted mask AT ALL TIMES, you must remain in isolation for the full 10 days from the onset of symptoms.       If you are NOT fully Vaccinated or had a Booster shot if eligible, but have had COVID EXPOSURE:  Quarantine for 10 days from the last day of exposure  Quarantine may be shortened if you have no symptoms and test Negative on day 5 post exposure.   Wear a well-fitted mask for 10 days from the last day of exposure.  If symptoms develop, stay home and get re-tested.     If HAVE been fully Vaccinated and had a Booster shot if eligible, but have had COVID EXPOSURE and have No Symptoms:  You do NOT need to quarantine  Wear a well-fitted mask for 10 days from the last day of exposure.  Get a COVID test on day 5.  If symptoms develop, stay home and get re-tested.                 Acute Bronchitis,  Adult    Acute bronchitis is when air tubes in the lungs (bronchi) suddenly get swollen. The condition can make it hard for you to breathe. In adults, acute bronchitis usually goes away within 2 weeks. A cough caused by bronchitis may last up to 3 weeks. Smoking, allergies, and asthma can make the condition worse.  What are the causes?  This condition is caused by:  · Cold and flu viruses. The most common cause of this condition is the virus that causes the common cold.  · Bacteria.  · Substances that irritate the lungs, including:  ? Smoke from cigarettes and other types of tobacco.  ? Dust and pollen.  ? Fumes from chemicals, gases, or burned fuel.  ? Other materials that pollute indoor or outdoor air.  · Close contact with someone who has acute bronchitis.  What increases the risk?  The following factors may make you more likely to develop this condition:  · A weak body's defense system. This is also called the immune system.  · Any condition that affects your lungs and breathing, such as asthma.  What are the signs or symptoms?  Symptoms of this condition include:  · A cough.  · Coughing up clear, yellow, or green mucus.  · Wheezing.  · Chest congestion.  · Shortness of breath.  · A fever.  · Body aches.  · Chills.  · A sore throat.  How is this treated?  Acute bronchitis may go away over time without treatment. Your doctor may recommend:  · Drinking more fluids.  · Taking a medicine for a fever or cough.  · Using a device that gets medicine into your lungs (inhaler).  · Using a vaporizer or a humidifier. These are machines that add water or moisture in the air to help with coughing and poor breathing.  Follow these instructions at home:    Activity  · Get a lot of rest.  · Avoid places where there are fumes from chemicals.  · Return to your normal activities as told by your doctor. Ask your doctor what activities are safe for you.  Lifestyle  · Drink enough fluids to keep your pee (urine) pale yellow.  · Do not  drink alcohol.  · Do not use any products that contain nicotine or tobacco, such as cigarettes, e-cigarettes, and chewing tobacco. If you need help quitting, ask your doctor. Be aware that:  ? Your bronchitis will get worse if you smoke or breathe in other people's smoke (secondhand smoke).  ? Your lungs will heal faster if you quit smoking.  General instructions  · Take over-the-counter and prescription medicines only as told by your doctor.  · Use an inhaler, cool mist vaporizer, or humidifier as told by your doctor.  · Rinse your mouth often with salt water. To make salt water, dissolve ½-1 tsp (3-6 g) of salt in 1 cup (237 mL) of warm water.  · Keep all follow-up visits as told by your doctor. This is important.  How is this prevented?  To lower your risk of getting this condition again:  · Wash your hands often with soap and water. If soap and water are not available, use hand .  · Avoid contact with people who have cold symptoms.  · Try not to touch your mouth, nose, or eyes with your hands.  · Make sure to get the flu shot every year.  Contact a doctor if:  · Your symptoms do not get better in 2 weeks.  · You vomit more than once or twice.  · You have symptoms of loss of fluid from your body (dehydration). These include:  ? Dark urine.  ? Dry skin or eyes.  ? Increased thirst.  ? Headaches.  ? Confusion.  ? Muscle cramps.  Get help right away if:  · You cough up blood.  · You have chest pain.  · You have very bad shortness of breath.  · You become dehydrated.  · You faint or keep feeling like you are going to faint.  · You keep vomiting.  · You have a very bad headache.  · Your fever or chills get worse.  These symptoms may be an emergency. Do not wait to see if the symptoms will go away. Get medical help right away. Call your local emergency services (911 in the [place] Do not drive yourself to the hospital.  Summary  · Acute bronchitis is when air tubes in the lungs (bronchi) suddenly get swollen.  In adults, acute bronchitis usually goes away within 2 weeks.  · Take over-the-counter and prescription medicines only as told by your doctor.  · Drink enough fluid to keep your pee (urine) pale yellow.  · Contact a doctor if your symptoms do not improve after 2 weeks of treatment.  · Get help right away if you cough up blood, faint, or have chest pain or shortness of breath.  This information is not intended to replace advice given to you by your health care provider. Make sure you discuss any questions you have with your health care provider.  Document Revised: 07/10/2020 Document Reviewed: 07/10/2020  [employer] Patient Education © 2021 [employer]

## 2022-01-15 NOTE — E-VISIT ESCALATED
Patient escalated   Provider Tierney Alexandra chose to escalate patient to another level of care because: Insufficient information to diagnose   Patient was sent the following message:   Based on the information you've provided us, you need to take another step to get care.   What to do now:    Please set up a video visit  .   You won't be charged for your eVisit. If you paid with a credit card, the charge will be reversed.   Chief Complaint: Coronavirus (COVID-19), cold, sinus pain, allergy, or flu   Patient introduction   Patient is 51-year-old male who reports cough, shortness of breath, and rhinitis that started 6-9 days ago.   Patient has not requested COVID testing.   Coronavirus Disease 2019 (COVID-19) exposure, testing history, vaccination status, and vaccine injection site symptoms:    Close contact with a person with a confirmed case of COVID-19.    Exposure was more than 7 days ago.    No recent travel outside of their local community.    Patient had a viral lab test 7-14 days ago. Test result was positive.    Reports receiving 2 doses of the COVID-19 vaccine.    Received the Moderna vaccine for the first dose.    Received the Moderna vaccine for the second dose.    Received their most recent dose of the vaccine > 14 days ago.   Reports heart disease.   Warning. The following may warrant further investigation:    Reports confirmed exposure to COVID-19    Hypertension    Heart disease    BMI of 30 to 39   When asked why they're seeking care online today, patient reports they want a specific treatment or medication, want to know if they need to be seen by a provider, and just want to feel better.   Patient requests a 7-day excuse note.   General presentation   Patient reports improvement of symptoms. Symptoms came on suddenly.   Fever:    Denies fever.   Sinus and nasal symptoms:    Reports rhinitis.    Denies nasal or sinus congestion.    Denies itchy nose or sneezing.    Denies nasal drainage.    Denies  postnasal drip.   Sore throat:    Denies sore throat.   Head and body aches:    Denies headache.    Denies sweats.    Denies chills.    Denies myalgia.    Denies fatigue.   Dizziness:    Reports mild dizziness that does not interfere with daily activities.   Cough:    Reports cough.    Cough is worse in the morning.    Cough is not productive of sputum.   Wheezing and SOB:    Reports wheezing.    Reports moderate shortness of breath that does not affect ADLs. Despite shortness of breath, patient can speak normally. Despite shortness of breath, patient can take a full, deep breath (inhaling for 3-4 seconds).    Denies COPD diagnosis.    Denies asthma diagnosis.    Denies previous albuterol inhaler use during URIs, bronchitis, or pneumonia.    Denies previous steroid inhaler use during URIs, bronchitis, or pneumonia.   Chest pain:    Reports chest pain, but only when coughing.    [place] Heart Score (MHS): 0, low risk of CAD. Assigning 1 point to each of 5 criteria (female >= 65 years old or male >= 55 years, known CAD, pain worse with exercise, pain not reproducible with palpation, and patient assumes pain is cardiac), the MHS is a validated clinical decision rule used to rule out coronary artery disease in primary care patients with chest pain.   Allergies:    Denies history of allergies.   Flu exposure:    Denies receiving a flu vaccine this season.   Patient denies the following red flags:    Severe shortness of breath    Inability to repeat a sentence without stopping for breath    Inability to take a full breath lasting 3-4 seconds    Changes in alertness or awareness    Decreased urination   Self-exam:    No difficulty moving their chin toward their chest    Neck lymph nodes feel normal    Denies antibiotic treatment for similar symptoms within the past month   Current medications   Denies taking over-the-counter medication for current symptoms.   Denies taking other medications or supplements.   Medication  allergies   None.   Medication contraindication review   Reports history positive for coronary artery disease and hypertension. Therefore, the following medication(s) will not be prescribed:    Metoclopramide    Acetaminophen-diphenhydramine-phenylephrine    Aspirin-chlorpheniramine-phenylephrine   Denies history of metoclopramide-associated dystonic reaction and tardive dyskinesia.   No known history of amoxicillin-clavulanate-associated cholestatic jaundice or hepatic impairment.   No known history of azithromycin-associated cholestatic jaundice or hepatic impairment.   Past medical history   Immune conditions: Denies immunocompromising conditions. Denies history of cancer.   Social history   Non-smoker.   Assessment:   Patient determined to need a level of care not appropriate to be delivered through eVisit.   Plan:   Patient informed of need to seek in-person care      ----------   Electronically signed by CELI Aparicio on 2022-01-15 at 15:13PM   ----------   Patient Interview Transcript:   Why are you getting care through eVisit today? We can't guarantee a specific treatment or test. Your provider will decide what's best for you. Select all that apply.    I want a specific treatment or medication    I want to know if I need to be seen by a provider    I just want to feel better!   Not selected:    I want to know if I have a cold or something more serious    I need a doctor's note    I want to be tested for COVID-19    I want to get the COVID-19 vaccine    I think I'm having side effects from the COVID-19 vaccine    None of the above   Tell us which specific treatment or medication you'd like. Your provider will make the final decision on which treatment is best for your condition.   The patient did not enter any additional information.   Which of these symptoms are bothering you? Select all that apply.    Cough    Shortness of breath    Runny nose   Not selected:    Fever    Stuffed-up nose or sinuses     Itchy or watery eyes    Itchy nose or sneezing    Loss of smell or taste    Sore throat    Hoarse voice or loss of voice    Headache    Sweats    Chills    Muscle or body aches    Fatigue or tiredness    Nausea or vomiting    Diarrhea    I don't have any of these symptoms   Before we learn more about why you're here, we'll get some information related to COVID-19. We'll ask about risk factors, testing, vaccination status, vaccine injection site symptoms, and exposure. Do you have any of these conditions? If so, you may be at increased risk for complications from COVID-19. Select all that apply.    Heart disease, such as congenital heart disease, congestive heart failure, or coronary artery disease   Not selected:    Chronic lung disease, such as cystic fibrosis or interstitial fibrosis    Disorder of the brain, spinal cord, or nerves and muscles, such as dementia, cerebral palsy, epilepsy, muscular dystrophy, or developmental delay    Metabolic disorder or mitochondrial disease    Cerebrovascular disease, such as stroke or another condition affecting the blood vessels or blood supply to the brain    None of the above   Do you live in a group care setting? Examples include: - Nursing home - Residential care - Psychiatric treatment facility - Group home - [facility] - [facility] and care home - Homeless shelter - Foster care setting Select one.    No   Not selected:    Yes   Have you ever been tested for COVID-19? Select one.    Yes   Not selected:    No   When was your most recent COVID-19 test? Select one.    7 to 14 days ago   Not selected:    Today    Yesterday    2 to 4 days ago    5 to 7 days ago    15 to 30 days ago    1 to 3 months ago    More than 3 months ago   What type of COVID-19 test did you most recently have? There are two types of COVID-19 tests: - Viral tests check if you're currently infected with COVID-19. For these tests, a nose swab or saliva sample is taken. Viral tests include self-tests and tests  "done at a doctor's office, lab, or testing site. - Antibody tests check if you've been infected in the past. For these tests, your blood is drawn. Antibody tests can only be done at a doctor's office, lab, or testing site. Select one.    Viral test at a doctor's office, lab, or testing site   Not selected:    Viral self-test    Antibody test   What was the result of your most recent COVID-19 test? Select one.    Positive   Not selected:    Negative    I'm not sure   Have you gotten the COVID-19 vaccine? Select one.    Yes   Not selected:    No   How many doses of the COVID-19 vaccine have you gotten? This includes boosters. Select one.    2 doses   Not selected:    1 dose    3 doses   Which COVID-19 vaccine did you get for your first dose? Check your Vaccination Record Card under Product Name/. Select one.    Moderna   Not selected:    Oneil & Oneil's Jayashree Vaccine (J&J/Jayashree)    Pfizer-BioNTech (Pfizer)   Which COVID-19 vaccine did you get for your second dose? Check your Vaccination Record Card under Product Name/. Select one.    Moderna   Not selected:    Oneil & Oneil's Jayashree Vaccine (J&J/Jayashree)    Pfizer-BioNTech (Pfizer)   When did you get your most recent dose of the COVID-19 vaccine?    More than 14 days ago   Not selected:    Less than 48 hours (2 days) ago    48 to 72 hours (3 days) ago    3 to 5 days ago    5 to 7 days ago    7 to 14 days ago   In the last 14 days, have you traveled outside of your local community? This includes travel by car, RV, bus, train, or plane. Travel increases your chances of getting and spreading COVID-19. Select one.    No   Not selected:    Yes   In the last 14 days, have you had close contact with someone who has coronavirus (COVID-19)? \"Close contact\" means any of these: - Living in the same household as someone with COVID-19. - Caring for someone with COVID-19. - Being within 6 feet of someone with COVID-19 for a total of at least 15 " "minutes over a 24-hour period. For example, three 5-minute exposures for a total of 15 minutes. - Being in direct contact with respiratory droplets from someone with COVID-19 (being coughed on, kissing, sharing utensils). Select one.    Yes, a confirmed case   Not selected:    Yes, a suspected case    No, not that I know of   When did the close contact happen? Select one.    More than 7 days ago   Not selected:    Within the last 2 days    3 to 7 days ago   Thanks for completing our COVID-19 questions. Now we'll return to your symptoms. When did your symptoms start? If you know the exact date your symptoms started, choose Other and enter the month and day. Select one.    6 to 9 days ago   Not selected:    Less than 48 hours ago    3 to 5 days ago    10 to 14 days ago    2 to 4 weeks ago    More than a month ago    Other (specify)   Did your symptoms come on suddenly or gradually? Select one.    Suddenly   Not selected:    Gradually    I'm not sure   Have your symptoms improved at all since they began? Select one.    Yes, but they haven't gone away completely   Not selected:    Yes, but then they came back worse than before    No    I'm not sure   How would you describe your shortness of breath? Sometimes shortness of breath can be a sign of a more serious condition. Select one.    Moderate (it's bad, but I can still do simple things like get dressed, bathe, or comb my hair)   Not selected:    Mild (about what I normally have with a cold)    Severe (it's so bad that I can't do simple things like get dressed, bathe, or comb my hair)   We'd like to find out more about your shortness of breath. Try to say the following sentence out loud: \"I went to the store today to buy bread, milk, and eggs.\" Are you able to get to the end of the sentence without stopping for breath? If not, you may need emergency care.    Yes   Not selected:    No   Are you able to take a full, deep breath? A full, deep breath means inhaling for 3 to " "4 seconds. If you can't do this, you may need to seek emergency care. Select one.    Yes   Not selected:    No   Do you cough so hard that it's made you gag or vomit? By gag, we mean has your coughing made you choke or dry heave? Select all that apply.    Yes, my coughing has made me gag   Not selected:    Yes, my coughing has made me vomit    No   When is your cough the worst? Select all that apply.    In the morning, or when I wake up   Not selected:    During the day    At nighttime, or while I'm sleeping    I'm not sure   Are you coughing up mucus or phlegm? Select one.    No, my cough is dry   Not selected:    Yes, a little    Yes, a lot   What color is your nasal drainage? Select one.    My nose is stuffed but not draining or running   Not selected:    Clear    White    Yellow    Green    I'm not sure   Is there any drainage (mucus) going down the back of your throat? This kind of drainage is also called \"postnasal drip.\" Select one.    No   Not selected:    Yes    I'm not sure   Since your symptoms started, have you felt dizzy? Select one.    Yes, but I can continue with my regular daily activities   Not selected:    Yes, and it makes it hard to stand, walk, or do daily activities    No   Do you have chest pain? You might also feel it as discomfort, aching, tightness, or squeezing in the chest. Select one.    Yes   Not selected:    No   Which of these is true of your chest pain? Select one.    My chest hurts only when I cough   Not selected:    My chest hurts even when I'm not coughing   Have you urinated at least 3 times in the last 24 hours? Select one.    Yes   Not selected:    No    I'm not sure   Changes in alertness or awareness may mean you need emergency care. Since your symptoms started, have you had any of these? Select all that apply.    None of the above   Not selected:    Confusion    Slurred speech    Not knowing where you are or what day it is    Difficulty staying conscious    Fainting or " passing out   Do your symptoms include a whistling sound, or wheezing, when you breathe? Select one.    Yes   Not selected:    No    I'm not sure   Do you have any of these symptoms in your ear(s)? Select all that apply.    None of the above   Not selected:    Pain    Pressure    Fullness    Crackling or popping    Plugged or blocked sensation   Can you move your chin toward your chest?    Yes   Not selected:    No, my neck is too stiff   Are your glands/lymph nodes swollen, or does it hurt when you touch them?    No   Not selected:    Yes    I'm not sure   People with a very high body mass index (BMI) are at higher risk for developing complications from the flu and severe illness from COVID-19. To determine your BMI, we need to know your weight and height. Please enter your weight (in pounds).    Weight   Please enter your height.    Height   In the past week, has anyone around you (such as at school, work, or home) had a confirmed diagnosis of the flu? A confirmed diagnosis means that a nose swab was done to verify a flu infection. Select all that apply.    I'm not sure   Not selected:    I live with someone who has the flu    I've been within touching distance of someone who has the flu    I've walked by, or sat about 3 feet away from, someone who has the flu    I've been in the same building as someone who has the flu    No   Have you ever been diagnosed with asthma? Select one.    No   Not selected:    Yes   Have you ever been prescribed albuterol to use for wheezing, cough, or shortness of breath caused by a cold, bronchitis, or pneumonia? Albuterol (ProAir, Proventil, Ventolin) is prescribed as an inhaler or a solution to be used with a nebulizer machine. Select one.    No   Not selected:    Yes    I'm not sure   Have you ever been prescribed a steroid inhaler to use for wheezing, cough, or shortness of breath caused by a cold, bronchitis, or pneumonia? Some examples of steroid inhalers include Pulmicort,  Flovent, Qvar, and Alvesco. Select one.    No   Not selected:    Yes    I'm not sure   Have you ever been diagnosed with chronic obstructive pulmonary disease (COPD)? Select one.    No   Not selected:    Yes    I'm not sure   Do you have allergies (pollen, dust mites, mold, animal dander)? Select one.    No   Not selected:    Yes    I'm not sure   Have you had a flu shot this season? Select one.    No   Not selected:    Yes, less than 2 weeks ago    Yes, 2 to 4 weeks ago    Yes, 1 to 3 months ago    Yes, 3 to 6 months ago    Yes, more than 6 months ago    I'm not sure   The flu and COVID-19 can be more serious for people with certain conditions or characteristics. These questions help us figure out if you or anyone you live with is at higher risk for complications from these infections. Do either of these statements apply to you? Select all that apply.    None of the above   Not selected:    I'm  or Native [place]    I'm a healthcare worker   Do you smoke tobacco? Select one.    No, never   Not selected:    Yes, every day    Yes, some days    No, I quit   Some conditions can put you at risk for more serious infections. Do any of these apply to you? Select all that apply.    None of the above   Not selected:    I've been hospitalized within the last 5 days    I have diabetes    I'm in close contact with a child in    Are you currently being treated for any of these conditions? Scroll to see all options. Select all that apply.    Blockage or narrowing of the blood vessels of the heart    High blood pressure   Not selected:    Aspirin triad (also known as Samter's triad or ASA triad)    Asthma or hives from taking aspirin or other NSAIDs, such as ibuprofen or naproxen    Blood dyscrasia, such anemia, leukemia, lymphoma, or myeloma    Bone marrow depression    Catecholamine-releasing paraganglioma    Blood clotting disorder    Congenital long QT syndrome    Depression    Difficulty urinating or  completely emptying your bladder    Uncorrected electrolyte abnormalities    Fungal infection    Gastrointestinal (GI) bleeding    Gastrointestinal (GI) obstruction    G6PD deficiency    Recent heart attack    Irregular heartbeat or heart rhythm    Kidney disease or hemodialysis    Mononucleosis (mono)    Myasthenia gravis    Parkinson's disease    Pheochromocytoma    Reye syndrome    Seizure disorder    Ulcerative colitis    None of the above   Do you have any of these conditions that can affect the immune system? Scroll to see all options. Select all that apply.    None of these   Not selected:    History of bone marrow transplant    Chronic kidney disease    Chronic liver disease (including cirrhosis)    HIV/AIDS    Inflammatory bowel disease (Crohn's disease or ulcerative colitis)    Lupus    Moderate to severe plaque psoriasis    Multiple sclerosis    Rheumatoid arthritis    Sickle cell anemia    Alpha or beta thalassemia    History of solid organ transplant (kidney, liver, or heart)    History of spleen removal    An autoimmune disorder not listed here    A condition requiring treatment with long-term use of oral steroids (such as prednisone, prednisolone, or dexamethasone)   Have you ever been diagnosed with cancer? Select one.    No   Not selected:    Yes, I have cancer now    Yes, but I'm in remission   Have you ever had either of these conditions? Select all that apply.    No   Not selected:    Metoclopramide-associated dystonic reaction    Tardive dyskinesia   Do any of these apply to the people who live with you? Select all that apply.    None of the above   Not selected:    A child under the age of 5    An adult 65 or older    A person who is pregnant    A person who has given birth, had a miscarriage, had a pregnancy loss, or had an  in the last 2 weeks    An  or Native [place]   Does any member of your household have any of these medical conditions? Select all that apply.     None of the above   Not selected:    Asthma    Disorders of the brain, spinal cord, or nerves and muscles, such as dementia, cerebral palsy, epilepsy, muscular dystrophy, or developmental delay    Chronic lung disease, such as COPD or cystic fibrosis    Heart disease, such as congenital heart disease, congestive heart failure, or coronary artery disease    Cerebrovascular disease, such as stroke or another condition affecting the blood vessels or blood supply to the brain    Blood disorders, such as sickle cell disease    Diabetes    Metabolic disorders such as inherited metabolic disorders or mitochondrial disease    Kidney disorders    Liver disorders    Weakened immune system due to illness or medications such as chemotherapy or steroids    Children under the age of 19 who are on long-term aspirin therapy    Extreme obesity (BMI > 40)   Just a few more questions about medications, and then you're finished. Have you used any non-prescription medications or nasal sprays for your current symptoms? Examples include saline sprays, decongestants, NyQuil, and Tylenol. Select one.    No   Not selected:    Yes   In the past month, have you taken antibiotics for similar symptoms? Examples of antibiotics include amoxicillin, amoxicillin-clavulanate (Augmentin), penicillin, cefdinir (Omnicef), doxycycline, and clindamycin (Cleocin). Select one.    No   Not selected:    Yes    I'm not sure   Have you taken any monoamine oxidase inhibitor (MAOI) medications in the last 14 days? Examples include rasagiline (Azilect), selegiline (Eldepryl, Zelapar), isocarboxazid (Marplan), phenelzine (Nardil), and tranylcypromine (Parnate). Select one.    I'm not sure   Not selected:    Yes    No   Do you take Kynmobi or Apokyn (apomorphine)? Select one.    No   Not selected:    Yes    I'm not sure   Are you taking any other medications or supplements? On the next screen, you need to list all vitamins, supplements, non-prescription medications  (such as aspirin or Aleve), and prescription medications that you're taking. Select one.    No   Not selected:    Yes    Yes, but I'm not sure what they are   Have you ever had an allergic or bad reaction to any medication? Select one.    No   Not selected:    Yes   Are you allergic to milk or to the proteins found in milk (for example, whey or casein)? A milk allergy is different from lactose intolerance. Select one.    No   Not selected:    Yes    I'm not sure   Have you ever had jaundice or liver problems as a result of taking amoxicillin-clavulanate (Augmentin)? Jaundice is a condition in which the skin and the whites of the eyes turn yellow. Select all that apply.    No, not that I know of   Not selected:    Yes, jaundice    Yes, liver problems   Have you ever had jaundice or liver problems as a result of taking azithromycin (Zithromax, Zmax)? Jaundice is a condition in which the skin and the whites of the eyes turn yellow. Select all that apply.    No, not that I know of   Not selected:    Yes, jaundice    Yes, liver problems   Do you need a doctor's note? A doctor's note confirms that you received care today and states when you can return to school or work. It does not contain information about your diagnosis or treatment plan. Your provider will make the final decision on whether to give you a doctor's note and for how long. Doctor's notes CANNOT be backdated. We can't provide medical leave paperwork through this type of visit. If more paperwork is needed to request time off, contact your primary care provider. Select one.    7 days   Not selected:    Today only (1 day)    Today and tomorrow (2 days)    3 days    10 days    14 days    No   Is there anything else you'd like to tell us about your symptoms?   The patient did not enter any additional information.   ----------   Medical history   Medical history data does not currently exist for this patient.

## 2022-01-17 ENCOUNTER — TELEMEDICINE (OUTPATIENT)
Dept: FAMILY MEDICINE CLINIC | Facility: TELEHEALTH | Age: 52
End: 2022-01-17

## 2022-01-17 DIAGNOSIS — U07.1 COVID-19: Primary | ICD-10-CM

## 2022-01-17 PROCEDURE — U0004 COV-19 TEST NON-CDC HGH THRU: HCPCS | Performed by: NURSE PRACTITIONER

## 2022-01-17 PROCEDURE — 99213 OFFICE O/P EST LOW 20 MIN: CPT | Performed by: NURSE PRACTITIONER

## 2022-01-17 NOTE — PATIENT INSTRUCTIONS
Order has been placed for SARS-CoV-2 (Coronavirus) test to be done at your nearest [facility]. You don't need to call the Urgent Care, just let them know when you arrive that you have been assessed by a Virtual Care Provider and that you have an order for testing. We will call with results when they are available. The results will be released to your [employer] sánchez. A [employer] message will also be sent after the first attempted call to notify you that your test results are available.

## 2022-01-17 NOTE — PROGRESS NOTES
Mode of Visit: Video  Location of patient: home  You have chosen to receive care through a telehealth visit.  The patient has signed the video visit consent form.  The visit included audio and video interaction. No technical issues occurred during this visit.     Chief Complaint  Exposure To Known Illness    Subjective          Matthew Morris presents to [facility]  Covid positive on 1/7/2022 after symptom onset on 1/6/2022. He is improved but he needs a test to return to work.    Objective   Vital Signs:   There were no vitals taken for this visit.    Physical Exam   Constitutional: He appears well-developed and well-nourished. No distress.   Pulmonary/Chest: Effort normal.  No respiratory distress.    Result Review :                 Assessment and Plan    Diagnoses and all orders for this visit:    1. COVID-19 (Primary)  -     COVID-19,APTIMA PANTHER(KATERINA),[facility]/ [facility], NP/OP SWAB IN UTM/VTM/SALINE TRANSPORT MEDIA,24 HR TAT - Swab, Nasal Cavity; Future              I spent 20 minutes caring for Matthew on this date of service. This time includes time spent by me in the following activities:preparing for the visit, obtaining and/or reviewing a separately obtained history, performing a medically appropriate examination and/or evaluation , counseling and educating the patient/family/caregiver, ordering medications, tests, or procedures, and documenting information in the medical record  Follow Up   Return if symptoms worsen or fail to improve.  Patient was given instructions and counseling regarding his condition or for health maintenance advice. Please see specific information pulled into the AVS if appropriate.

## 2022-01-20 PROCEDURE — U0004 COV-19 TEST NON-CDC HGH THRU: HCPCS | Performed by: EMERGENCY MEDICINE

## 2022-03-02 ENCOUNTER — OFFICE VISIT (OUTPATIENT)
Dept: CARDIOLOGY | Facility: CLINIC | Age: 52
End: 2022-03-02

## 2022-03-02 VITALS
HEIGHT: 66 IN | SYSTOLIC BLOOD PRESSURE: 130 MMHG | WEIGHT: 235 LBS | BODY MASS INDEX: 37.77 KG/M2 | DIASTOLIC BLOOD PRESSURE: 70 MMHG | HEART RATE: 57 BPM

## 2022-03-02 DIAGNOSIS — I10 PRIMARY HYPERTENSION: ICD-10-CM

## 2022-03-02 DIAGNOSIS — I25.10 CORONARY ARTERY DISEASE INVOLVING NATIVE CORONARY ARTERY OF NATIVE HEART WITHOUT ANGINA PECTORIS: Primary | ICD-10-CM

## 2022-03-02 PROCEDURE — 93000 ELECTROCARDIOGRAM COMPLETE: CPT | Performed by: NURSE PRACTITIONER

## 2022-03-02 PROCEDURE — 99214 OFFICE O/P EST MOD 30 MIN: CPT | Performed by: NURSE PRACTITIONER

## 2022-03-02 NOTE — PROGRESS NOTES
Date of Office Visit: 2022  Encounter Provider: Elenita Herrera, TYRA, APRN  Place of Service: [facility] CARDIOLOGY  Patient Name: Matthew Morris  :1970        Subjective:     Chief Complaint:  Transient cardiomyopathy, CAD, hypertension      History of Present Illness:  Matthew Morris is a 51 y.o. male patient of Dr. Carson.  I am seeing this patient in the office today and I have reviewed his records.    Patient has a history of coronary artery disease, hypertension, non-ST elevation MI, palpitations, obstructive sleep apnea on CPAP, dyslipidemia.     Patient was transferred from [facility] 2016 after chest pain and elevated troponin consistent with non-ST elevation myocardial infarction.  Echocardiogram showed EF of 40% and heart catheterization showed severe disease of the right coronary artery for which she received a drug-eluting stent.  He has had intermittent chest pain since that time in the setting of hypertension.  He was later admitted 2016 for an episode of chest pain and ruled out for myocardial infarction.  Echo showed EF improved to 50%, mild to moderate mitral regurgitation.  2017 he had a stress echo showing no evidence of ischemia at a good workload and normal left ventricular systolic function with mild LVH and no significant valvular dysfunction.  He complained of brief palpitations and atypical chest discomfort and Zio patch showed no arrhythmia.  He was seen in the office 2020 by Dr. Carson for surgery clearance prior to knee replacement.  Stress echo showed normal LV systolic function with EF of 61 to 65%, mild concentric LVH, mildly dilated left ventricular cavity, trace aortic regurgitation, trace mitral regurgitation, trace tricuspid regurgitation, normal exercise EKG and normal exercise echocardiogram without significant echocardiographic evidence of ischemia.      Patient presents to office today for follow-up  appointment.  Patient reports he is doing well overall since last visit.  He unfortunately had COVID-19 in early January.  He reports he was treated for bronchitis and given antibiotics and albuterol inhaler.  It took him several weeks to feel better but he does feel like he has improved.  In January he also did have 1 isolated episode of 1-2 skipped heartbeat sensation, no recurrence, nothing sustained, no tachyarrhythmias.  Since having COVID he still has some mild shortness of breath after going up and down the stairs but this has improved and albuterol helps as well.  He is back to walking at least 2 miles a day continuously without any chest pain or discomfort or shortness of breath or other exertional symptoms or concerns.  He continues to have some mild dependent edema by the end of the day, has a history of this intermittently for years.  Was having some vertigo symptoms when he had COVID which have since improved, no recurrence in last couple of weeks.  He denies any chest pain or discomfort, syncope, near syncope, falls, or abnormal bleeding.  Blood pressure and heart rate have been well controlled.  Continues to use CPAP nightly and reports sleep medicine recently made adjustments to settings.            Past Medical History:   Diagnosis Date   • Angina pectoris (HCC)    • Aortic calcification (HCC) 2008   • Arthritis    • CAD (coronary artery disease)     NSTEMI 4/2016: cath with 99% RCA s/p 3.5x12mm WENDIE, 30% ostial LCx, normal LM/LAD   • Cervical radiculopathy    • Chest pain    • Coronary artery disease involving native coronary artery of native heart without angina pectoris    • HERNÁNDEZ (dyspnea on exertion)    • Essential hypertension    • Headache    • Hyperlipidemia    • Hypertension    • Joint pain    • Joint swelling    • Knee pain    • Leg swelling    • Malaise and fatigue    • Moderate mitral regurgitation     by echo 4/2016   • Neck pain    • Palpitations    • Pinched nerve in neck 2016   •  Precordial pain    • SOB (shortness of breath)      Past Surgical History:   Procedure Laterality Date   • CARDIAC CATHETERIZATION Left 4/28/2016    Procedure: Cardiac catheterization;  Surgeon: Linda Slaughter MD;  Location: [facility] INVASIVE LOCATION;  Service:    • CARDIAC CATHETERIZATION N/A 4/28/2016    Procedure: Left ventriculography;  Surgeon: Linda Slaughter MD;  Location: [facility] INVASIVE LOCATION;  Service:    • CORONARY ANGIOPLASTY WITH STENT PLACEMENT     • CYST REMOVAL  1997    right testicle, benign   • HERNIA REPAIR     • HYDROCELE EXCISION / REPAIR  1997     Outpatient Medications Prior to Visit   Medication Sig Dispense Refill   • albuterol sulfate  (90 Base) MCG/ACT inhaler Inhale 2 puffs Every 4 (Four) Hours As Needed for Wheezing or Shortness of Air. 6.7 g 0   • amLODIPine (NORVASC) 2.5 MG tablet Take 1 tablet by mouth Every 12 (Twelve) Hours. 180 tablet 3   • aspirin 81 MG EC tablet Take 81 mg by mouth daily.     • atorvastatin (LIPITOR) 80 MG tablet Take 1 tablet by mouth daily. 90 tablet 3   • azithromycin (Zithromax) 250 MG tablet Take 2 tablets the first day, then 1 tablet daily for 4 days. 6 tablet 0   • benzonatate (TESSALON) 200 MG capsule Take 1 capsule by mouth 3 (Three) Times a Day As Needed for Cough. 28 capsule 0   • butalbital-acetaminophen-caffeine (FIORICET, ESGIC) -40 MG per tablet Take 1 tablet by mouth As Needed.     • clopidogrel (PLAVIX) 75 MG tablet Take 1 tablet by mouth Daily. 90 tablet 1   • cyclobenzaprine (FLEXERIL) 10 MG tablet Take 10 mg by mouth As Needed.     • fluticasone (Flonase) 50 MCG/ACT nasal spray 2 sprays into the nostril(s) as directed by provider Daily. 16 mL 0   • lamoTRIgine (LaMICtal) 25 MG tablet Take 25 mg by mouth Daily.     • lisinopril-hydrochlorothiazide (PRINZIDE,ZESTORETIC) 20-25 MG per tablet Take 1 tablet by mouth Daily. 90 tablet 3   • MEGARED OMEGA-3 KRILL  MG capsule Take  by mouth Daily.     • Metoprolol  "Tartrate 75 MG tablet Take 75 mg by mouth 2 (Two) Times a Day. 180 tablet 3   • pantoprazole (PROTONIX) 40 MG EC tablet Take 40 mg by mouth Daily.     • predniSONE (DELTASONE) 20 MG tablet Prednisone 20mg tabs, 3 for 2 days, 2 for 2 days, 1 for 2 days, 1/2 for 2 days take with food or milk 13 tablet 0   • promethazine-dextromethorphan (PROMETHAZINE-DM) 6.25-15 MG/5ML syrup Take 5 mL by mouth At Night As Needed for Cough. 118 mL 0   • VOLTAREN 1 % gel gel As Needed.       No facility-administered medications prior to visit.       Allergies as of 03/02/2022   • (No Known Allergies)     Social History     Socioeconomic History   • Marital status:    Tobacco Use   • Smoking status: Never Smoker   • Smokeless tobacco: Never Used   Substance and Sexual Activity   • Alcohol use: No     Alcohol/week: 0.0 standard drinks     Comment: Daily caffeine use   • Drug use: Never   • Sexual activity: Defer     Family History   Problem Relation Age of Onset   • Heart disease Mother    • Heart disease Father        Review of Systems   Constitutional: Positive for malaise/fatigue.   Cardiovascular:        SEE HPI   Respiratory: Positive for shortness of breath.    Hematologic/Lymphatic: Negative for bleeding problem.   Musculoskeletal: Negative for falls.   Gastrointestinal: Negative for melena.   Genitourinary: Negative for hematuria.   Psychiatric/Behavioral: Negative for altered mental status.          Objective:     Vitals:    03/02/22 1011   BP: 130/70   BP Location: Left arm   Patient Position: Sitting   Pulse: 57   Weight: 107 kg (235 lb)   Height: 167.6 cm (66\")     Body mass index is 37.93 kg/m².      PHYSICAL EXAM:  Constitutional:       General: Not in acute distress.     Appearance: Well-developed. Not diaphoretic.   Pulmonary:      Effort: Pulmonary effort is normal. No respiratory distress.      Breath sounds: Normal breath sounds. No wheezing. No rales.   Cardiovascular:      Normal rate. Regular rhythm.      " Murmurs: There is no murmur.      No gallop. No click. No rub.   Edema:     Peripheral edema absent.   Abdominal:      General: Bowel sounds are normal. There is no distension.      Palpations: Abdomen is soft.   Musculoskeletal: Normal range of motion.         General: No tenderness or deformity.      Cervical back: Neck supple. Skin:     General: Skin is warm and dry.      Findings: No erythema or rash.   Neurological:      Mental Status: Alert and oriented to person, place, and time.   Psychiatric:         Behavior: Behavior normal.         Judgment: Judgment normal.             ECG 12 Lead    Date/Time: 3/2/2022 10:17 AM  Performed by: Elenita Herrera DNP, APRN  Authorized by: Elenita Herrera DNP, APRN   Comparison: compared with previous ECG from 7/15/2021  Rhythm: sinus rhythm  BPM: 57  Comments: No significant changes from previous EKG              Assessment:       Diagnosis Plan   1. Coronary artery disease involving native coronary artery of native heart without angina pectoris     2. Primary hypertension           Plan:     1. Coronary artery disease: With history of NSTEMI status post drug-eluting stent placement.  Negative stress test at a great workload 2/2020.  He continues to walk 2 miles a day continuously with no chest pain or shortness of breath symptoms.  Symptoms.  Continue with aggressive risk factor modification.  Has been maintained on aspirin, Plavix, high intensity statin therapy.  2. Hypertension: Controlled.  Patient continue to monitor and call if staying greater than 130/80 on average  3. Hyperlipidemia: PCP following.  LDL goal less than 70.  Cholesterol had been elevated in the fall, did improve on last labs in February though still slightly above goal with LDL 79.8.  He is going to continue with healthy diet and exercise and get this rechecked in a few months to ensure it is at goal.    4. Palpitations: one isolated 1-2 skipped beat sensation a couple months ago.  Sounds  benign, likely isolated ectopy.  Patient to call for recurrent issues.  5. Mild dependent edema: Chronic intermittent.  None present on exam.  Recommend elevation, avoiding salt, compression socks.  Call if symptoms worsen or fail to improve.  6. Obstructive sleep apnea on CPAP: Follows with sleep medicine and reports adjustments were recently made to settings  7. History of COVID-19 infection: improved    Patient to keep follow-up with Dr. Carson in July as scheduled or call sooner if needed for any new, recurrent, or worsening symptoms or concerns./Symptoms that were understandable and followed to the office or ER visit.             Your medication list          Accurate as of March 2, 2022 10:16 AM. If you have any questions, ask your nurse or doctor.            CONTINUE taking these medications      Instructions Last Dose Given Next Dose Due   albuterol sulfate  (90 Base) MCG/ACT inhaler  Commonly known as: PROVENTIL HFA;VENTOLIN HFA;PROAIR HFA      Inhale 2 puffs Every 4 (Four) Hours As Needed for Wheezing or Shortness of Air.       amLODIPine 2.5 MG tablet  Commonly known as: NORVASC      Take 1 tablet by mouth Every 12 (Twelve) Hours.       aspirin 81 MG EC tablet      Take 81 mg by mouth daily.       atorvastatin 80 MG tablet  Commonly known as: LIPITOR      Take 1 tablet by mouth daily.       azithromycin 250 MG tablet  Commonly known as: Zithromax      Take 2 tablets the first day, then 1 tablet daily for 4 days.       benzonatate 200 MG capsule  Commonly known as: TESSALON      Take 1 capsule by mouth 3 (Three) Times a Day As Needed for Cough.       butalbital-acetaminophen-caffeine -40 MG per tablet  Commonly known as: FIORICET, ESGIC      Take 1 tablet by mouth As Needed.       clopidogrel 75 MG tablet  Commonly known as: PLAVIX      Take 1 tablet by mouth Daily.       cyclobenzaprine 10 MG tablet  Commonly known as: FLEXERIL      Take 10 mg by mouth As Needed.       fluticasone 50 MCG/ACT  nasal spray  Commonly known as: Flonase      2 sprays into the nostril(s) as directed by provider Daily.       lamoTRIgine 25 MG tablet  Commonly known as: LaMICtal      Take 25 mg by mouth Daily.       lisinopril-hydrochlorothiazide 20-25 MG per tablet  Commonly known as: PRINZIDE,ZESTORETIC      Take 1 tablet by mouth Daily.       MegaRed Omega-3 Krill Oil 500 MG capsule      Take  by mouth Daily.       Metoprolol Tartrate 75 MG tablet      Take 75 mg by mouth 2 (Two) Times a Day.       pantoprazole 40 MG EC tablet  Commonly known as: PROTONIX      Take 40 mg by mouth Daily.       predniSONE 20 MG tablet  Commonly known as: DELTASONE      Prednisone 20mg tabs, 3 for 2 days, 2 for 2 days, 1 for 2 days, 1/2 for 2 days take with food or milk       promethazine-dextromethorphan 6.25-15 MG/5ML syrup  Commonly known as: PROMETHAZINE-DM      Take 5 mL by mouth At Night As Needed for Cough.       Voltaren 1 % gel gel  Generic drug: Diclofenac Sodium      As Needed.              The above medication changes may not have been made by this provider.  Patient's medication list was updated to reflect medications they are currently taking including medication changes made by other providers.            Thanks,    Elenita Herrera, DNP, APRN  03/02/2022         Dictated utilizing Dragon dictation

## 2022-04-15 ENCOUNTER — TELEMEDICINE (OUTPATIENT)
Dept: FAMILY MEDICINE CLINIC | Facility: TELEHEALTH | Age: 52
End: 2022-04-15

## 2022-04-15 DIAGNOSIS — Z20.822 EXPOSURE TO COVID-19 VIRUS: Primary | ICD-10-CM

## 2022-04-15 PROCEDURE — 99212 OFFICE O/P EST SF 10 MIN: CPT | Performed by: NURSE PRACTITIONER

## 2022-04-15 PROCEDURE — U0004 COV-19 TEST NON-CDC HGH THRU: HCPCS | Performed by: EMERGENCY MEDICINE

## 2022-04-15 NOTE — PATIENT INSTRUCTIONS
Go to the nearest [facility] for your Covid-19 test. Wear a mask. When you arrive, notify the staff that you have done a virtual visit and have a covid test ordered. You will not need to be seen again by a provider. You will be notified in 1-5 days about the results of your test, by telephone call from a blocked cell number, and via [employer].

## 2022-04-15 NOTE — PROGRESS NOTES
Subjective   Matthew Morris is a 51 y.o. male.     He was exposed to a covid-19 positive person four days ago, although he was wearing a mask. He has not had any symptoms of covid-19. He is fully vaccinated against covid-19 and had a positive covid-19 test 1/7/22.       The following portions of the patient's history were reviewed and updated as appropriate: allergies, current medications, past family history, past medical history, past social history, past surgical history, and problem list.    Review of Systems   All other systems reviewed and are negative.      Objective   Physical Exam  Constitutional:       General: He is not in acute distress.     Appearance: He is well-developed. He is not diaphoretic.   Pulmonary:      Effort: Pulmonary effort is normal.   Neurological:      Mental Status: He is alert and oriented to person, place, and time.   Psychiatric:         Behavior: Behavior normal.           Assessment/Plan   Diagnoses and all orders for this visit:    1. Exposure to COVID-19 virus (Primary)  -     COVID-19,APTIMA PANTHER(KATERINA),[facility]/ [facility], NP/OP SWAB IN UTM/VTM/SALINE TRANSPORT MEDIA,24 HR TAT - Swab, Nasopharynx; Future                 The use of a video visit has been reviewed with the patient and verbal informed consent has been obtained. Myself and Matthew Morris participated in this visit. The patient is located in [place], [place]. I am located in [place], [place]. Mychart and Zoom were utilized. I spent 14 minutes in the patient's chart for this visit.

## 2022-08-25 ENCOUNTER — OFFICE VISIT (OUTPATIENT)
Dept: CARDIOLOGY | Facility: CLINIC | Age: 52
End: 2022-08-25

## 2022-08-25 VITALS
DIASTOLIC BLOOD PRESSURE: 88 MMHG | WEIGHT: 234.8 LBS | BODY MASS INDEX: 37.73 KG/M2 | HEIGHT: 66 IN | HEART RATE: 59 BPM | SYSTOLIC BLOOD PRESSURE: 122 MMHG

## 2022-08-25 DIAGNOSIS — Z01.810 PREOP CARDIOVASCULAR EXAM: ICD-10-CM

## 2022-08-25 DIAGNOSIS — R06.09 DOE (DYSPNEA ON EXERTION): Primary | ICD-10-CM

## 2022-08-25 DIAGNOSIS — I10 PRIMARY HYPERTENSION: ICD-10-CM

## 2022-08-25 DIAGNOSIS — I25.2 HISTORY OF NON-ST ELEVATION MYOCARDIAL INFARCTION (NSTEMI): ICD-10-CM

## 2022-08-25 PROCEDURE — 99214 OFFICE O/P EST MOD 30 MIN: CPT | Performed by: NURSE PRACTITIONER

## 2022-08-25 PROCEDURE — 93000 ELECTROCARDIOGRAM COMPLETE: CPT | Performed by: NURSE PRACTITIONER

## 2022-08-25 NOTE — PROGRESS NOTES
Date of Office Visit: 2022  Encounter Provider: Elenita Herrera, TYRA, APRN  Place of Service: [facility] CARDIOLOGY  Patient Name: Matthew Morris  :1970        Subjective:     Chief Complaint:  Coronary artery disease, dyspnea on exertion, history of cardiomyopathy.      History of Present Illness:  Matthew Morris is a 52 y.o. male patient of Dr. Carson.      Patient has a history of coronary artery disease, hypertension, non-ST elevation MI, palpitations, obstructive sleep apnea on CPAP, dyslipidemia.     Patient was transferred from [facility] 2016 after chest pain and elevated troponin consistent with non-ST elevation myocardial infarction.  Echocardiogram showed EF of 40% and heart catheterization showed severe disease of the right coronary artery for which she received a drug-eluting stent.  He has had intermittent chest pain since that time in the setting of hypertension.  He was later admitted 2016 for an episode of chest pain and ruled out for myocardial infarction.  Echo showed EF improved to 50%, mild to moderate mitral regurgitation.  2017 he had a stress echo showing no evidence of ischemia at a good workload and normal left ventricular systolic function with mild LVH and no significant valvular dysfunction.  He complained of brief palpitations and atypical chest discomfort and Zio patch showed no arrhythmia.  He was seen in the office 2020 by Dr. Carson for surgery clearance prior to knee replacement.  Stress echo showed normal LV systolic function with EF of 61 to 65%, mild concentric LVH, mildly dilated left ventricular cavity, trace aortic regurgitation, trace mitral regurgitation, trace tricuspid regurgitation, normal exercise EKG and normal exercise echocardiogram without significant echocardiographic evidence of ischemia.       Patient presents to office today for follow-up appointment.  Patient reports he has been  doing well overall since last visit.  He reports that he has been walking about 1.5 to 2 miles 3 times a week without symptoms with this.  However a few weeks ago he started noticing that he was having quite a bit of shortness of breath going up the stairs which is new and concerning for him.  He denies cough, fever, or chills.  Denies chest pain or discomfort.  Denies palpitations, syncope, near syncope, falls, or abnormal bleeding.  He also reports that he is needing cardiac clearance prior [facility].  He reports that they have exhausted all other treatment options for his knee issues.  Blood pressure at home runs 120-130 over 70s to 80s.  He does continue to have some chronic mild bilateral dependent lower extremity edema by the end of the day, no edema on exam today.        Past Medical History:   Diagnosis Date   • Angina pectoris (HCC)    • Aortic calcification (HCC) 2008   • Arthritis    • CAD (coronary artery disease)     NSTEMI 4/2016: cath with 99% RCA s/p 3.5x12mm WENDIE, 30% ostial LCx, normal LM/LAD   • Cervical radiculopathy    • Chest pain    • Coronary artery disease involving native coronary artery of native heart without angina pectoris    • HERNÁNDEZ (dyspnea on exertion)    • Essential hypertension    • Headache    • Hyperlipidemia    • Hypertension    • Joint pain    • Joint swelling    • Knee pain    • Leg swelling    • Malaise and fatigue    • Moderate mitral regurgitation     by echo 4/2016   • Neck pain    • Palpitations    • Pinched nerve in neck 2016   • Precordial pain    • SOB (shortness of breath)      Past Surgical History:   Procedure Laterality Date   • CARDIAC CATHETERIZATION Left 4/28/2016    Procedure: Cardiac catheterization;  Surgeon: Linda Slaughter MD;  Location: [facility] CATH INVASIVE LOCATION;  Service:    • CARDIAC CATHETERIZATION N/A 4/28/2016    Procedure: Left ventriculography;  Surgeon: Linda Slaughter MD;  Location: [facility] CATH INVASIVE LOCATION;   Service:    • CORONARY ANGIOPLASTY WITH STENT PLACEMENT     • CYST REMOVAL  1997    right testicle, benign   • HERNIA REPAIR     • HYDROCELE EXCISION / REPAIR  1997     Outpatient Medications Prior to Visit   Medication Sig Dispense Refill   • albuterol sulfate  (90 Base) MCG/ACT inhaler Inhale 2 puffs Every 4 (Four) Hours As Needed for Wheezing or Shortness of Air. 6.7 g 0   • amLODIPine (NORVASC) 2.5 MG tablet Take 1 tablet by mouth Every 12 (Twelve) Hours. 180 tablet 3   • aspirin 81 MG EC tablet Take 81 mg by mouth daily.     • atorvastatin (LIPITOR) 80 MG tablet Take 1 tablet by mouth daily. 90 tablet 3   • butalbital-acetaminophen-caffeine (FIORICET, ESGIC) -40 MG per tablet Take 1 tablet by mouth As Needed.     • clopidogrel (PLAVIX) 75 MG tablet Take 1 tablet by mouth Daily. 90 tablet 1   • cyclobenzaprine (FLEXERIL) 10 MG tablet Take 10 mg by mouth As Needed.     • fluticasone (Flonase) 50 MCG/ACT nasal spray 2 sprays into the nostril(s) as directed by provider Daily. 16 mL 0   • lamoTRIgine (LaMICtal) 25 MG tablet Take 25 mg by mouth Daily.     • lisinopril-hydrochlorothiazide (PRINZIDE,ZESTORETIC) 20-25 MG per tablet Take 1 tablet by mouth Daily. 90 tablet 3   • MEGARED OMEGA-3 KRILL  MG capsule Take  by mouth Daily.     • Metoprolol Tartrate 75 MG tablet Take 75 mg by mouth 2 (Two) Times a Day. 180 tablet 3   • azithromycin (Zithromax) 250 MG tablet Take 2 tablets the first day, then 1 tablet daily for 4 days. 6 tablet 0   • benzonatate (TESSALON) 200 MG capsule Take 1 capsule by mouth 3 (Three) Times a Day As Needed for Cough. 28 capsule 0   • pantoprazole (PROTONIX) 40 MG EC tablet Take 40 mg by mouth Daily.     • predniSONE (DELTASONE) 20 MG tablet Prednisone 20mg tabs, 3 for 2 days, 2 for 2 days, 1 for 2 days, 1/2 for 2 days take with food or milk 13 tablet 0   • promethazine-dextromethorphan (PROMETHAZINE-DM) 6.25-15 MG/5ML syrup Take 5 mL by mouth At Night As Needed for Cough.  "118 mL 0   • VOLTAREN 1 % gel gel As Needed.       No facility-administered medications prior to visit.       Allergies as of 08/25/2022   • (No Known Allergies)     Social History     Socioeconomic History   • Marital status:    Tobacco Use   • Smoking status: Never Smoker   • Smokeless tobacco: Never Used   Substance and Sexual Activity   • Alcohol use: No     Alcohol/week: 0.0 standard drinks     Comment: Daily caffeine use   • Drug use: Never   • Sexual activity: Defer     Family History   Problem Relation Age of Onset   • Heart disease Mother    • Heart disease Father        Review of Systems   Cardiovascular:        SEE HPI   Respiratory: Positive for shortness of breath.    Hematologic/Lymphatic: Negative for bleeding problem.   Musculoskeletal: Negative for falls.   Gastrointestinal: Negative for melena.   Genitourinary: Negative for hematuria.   Neurological: Negative for dizziness.   Psychiatric/Behavioral: Negative for altered mental status.          Objective:     Vitals:    08/25/22 1326   BP: 122/88   BP Location: Left arm   Patient Position: Sitting   Pulse: 59   Weight: 107 kg (234 lb 12.8 oz)   Height: 167.6 cm (66\")     Body mass index is 37.9 kg/m².      PHYSICAL EXAM:  Constitutional:       General: Not in acute distress.     Appearance: Well-developed. Not diaphoretic.   Eyes:      Pupils: Pupils are equal, round, and reactive to light.   Neck:      Vascular: No JVD.   Pulmonary:      Effort: Pulmonary effort is normal. No respiratory distress.      Breath sounds: Normal breath sounds. No wheezing. No rales.   Cardiovascular:      Normal rate. Regular rhythm.      Murmurs: There is no murmur.      No gallop. No click. No rub.   Edema:     Peripheral edema absent.   Abdominal:      General: Bowel sounds are normal. There is no distension.      Palpations: Abdomen is soft.   Musculoskeletal: Normal range of motion.         General: No tenderness or deformity.      Cervical back: Neck " supple. Skin:     General: Skin is warm and dry.      Findings: No erythema or rash.   Neurological:      Mental Status: Alert and oriented to person, place, and time.             ECG 12 Lead    Date/Time: 8/25/2022 1:40 PM  Performed by: Elenita Herrera DNP, APRN  Authorized by: Elenita Herrera DNP, APRN   Comparison: compared with previous ECG from 7/15/2021  Rhythm: sinus rhythm  BPM: 59  Other findings: non-specific ST-T wave changes  Comments: No significant changes from previous EKGs              Assessment:       Diagnosis Plan   1. HERNÁNDEZ (dyspnea on exertion)  Adult Transthoracic Echo Complete w/ Color, Spectral and Contrast if Necessary Per Protocol   2. Preop cardiovascular exam     3. Primary hypertension     4. History of non-ST elevation myocardial infarction (NSTEMI)           Plan:     1. Coronary artery disease: With NSTEMI status post drug-eluting stent placement in 2016.  He really does not recall having symptoms leading up to this.  He reports he did have diaphoresis when he presented to the ER.  Has been maintained on aspirin, Plavix, high intensity statin.  He has been having shortness of breath going up the stairs for the last few weeks which is concerning to him.  He also reports he is needing preop cardiac clearance prior to knee replacement.  He would like to proceed with stress test to evaluate further.  If normal then would recommend increasing activity levels and calling if symptoms worsen or fail to improve.  2. Dyspnea on exertion: Check stress test as above.  Patient prefers nuclear stress test.  Will also check echo given history of cardiomyopathy in the past.   3. Hypertension: Overall well controlled.  Patient continue to monitor and call if staying greater than 130/80 on average.  4. Hyperlipidemia: PCP managing.  LDL goal less than 70.  5. Mild dependent edema: Chronic and unchanged  6. Obstructive sleep apnea on CPAP: Using regularly and following with sleep  medicine.    Patient to follow-up with Dr. Carson in 6 months or sooner if needed for any new, recurrent, or worsening symptoms or concerns.  Discussed in detail signs/symptoms that warrant sooner call or follow-up to the office or ER visit.             Your medication list          Accurate as of August 25, 2022  2:04 PM. If you have any questions, ask your nurse or doctor.            CONTINUE taking these medications      Instructions Last Dose Given Next Dose Due   albuterol sulfate  (90 Base) MCG/ACT inhaler  Commonly known as: PROVENTIL HFA;VENTOLIN HFA;PROAIR HFA      Inhale 2 puffs Every 4 (Four) Hours As Needed for Wheezing or Shortness of Air.       amLODIPine 2.5 MG tablet  Commonly known as: NORVASC      Take 1 tablet by mouth Every 12 (Twelve) Hours.       aspirin 81 MG EC tablet      Take 81 mg by mouth daily.       atorvastatin 80 MG tablet  Commonly known as: LIPITOR      Take 1 tablet by mouth daily.       butalbital-acetaminophen-caffeine -40 MG per tablet  Commonly known as: FIORICET, ESGIC      Take 1 tablet by mouth As Needed.       clopidogrel 75 MG tablet  Commonly known as: PLAVIX      Take 1 tablet by mouth Daily.       cyclobenzaprine 10 MG tablet  Commonly known as: FLEXERIL      Take 10 mg by mouth As Needed.       fluticasone 50 MCG/ACT nasal spray  Commonly known as: Flonase      2 sprays into the nostril(s) as directed by provider Daily.       lamoTRIgine 25 MG tablet  Commonly known as: LaMICtal      Take 25 mg by mouth Daily.       lisinopril-hydrochlorothiazide 20-25 MG per tablet  Commonly known as: PRINZIDE,ZESTORETIC      Take 1 tablet by mouth Daily.       MegaRed Omega-3 Krill Oil 500 MG capsule      Take  by mouth Daily.       Metoprolol Tartrate 75 MG tablet      Take 75 mg by mouth 2 (Two) Times a Day.              The above medication changes may not have been made by this provider.  Patient's medication list was updated to reflect medications they are currently  taking including medication changes made by other providers.            Thanks,    Elenita Herrera, DNP, APRN  08/25/2022         Dictated utilizing Dragon dictation

## 2022-09-08 ENCOUNTER — HOSPITAL ENCOUNTER (OUTPATIENT)
Dept: CARDIOLOGY | Facility: HOSPITAL | Age: 52
Discharge: HOME | End: 2022-09-08

## 2022-09-08 VITALS
SYSTOLIC BLOOD PRESSURE: 130 MMHG | BODY MASS INDEX: 37.93 KG/M2 | WEIGHT: 236 LBS | DIASTOLIC BLOOD PRESSURE: 74 MMHG | HEIGHT: 66 IN | HEART RATE: 54 BPM

## 2022-09-08 VITALS — BODY MASS INDEX: 37.91 KG/M2 | HEIGHT: 66 IN | WEIGHT: 235.89 LBS

## 2022-09-08 DIAGNOSIS — R06.09 DOE (DYSPNEA ON EXERTION): ICD-10-CM

## 2022-09-08 LAB
AORTIC ARCH: 2.8 CM
ASCENDING AORTA: 3 CM
BH CV ECHO MEAS - ACS: 2.26 CM
BH CV ECHO MEAS - AO MAX PG: 7.1 MMHG
BH CV ECHO MEAS - AO MEAN PG: 3.3 MMHG
BH CV ECHO MEAS - AO ROOT DIAM: 3.2 CM
BH CV ECHO MEAS - AO V2 MAX: 133 CM/SEC
BH CV ECHO MEAS - AO V2 VTI: 29.4 CM
BH CV ECHO MEAS - AVA(I,D): 2.13 CM2
BH CV ECHO MEAS - EDV(CUBED): 103.1 ML
BH CV ECHO MEAS - EDV(MOD-SP2): 104 ML
BH CV ECHO MEAS - EDV(MOD-SP4): 100 ML
BH CV ECHO MEAS - EF(MOD-BP): 62 %
BH CV ECHO MEAS - EF(MOD-SP2): 63.5 %
BH CV ECHO MEAS - EF(MOD-SP4): 60 %
BH CV ECHO MEAS - ESV(CUBED): 23.5 ML
BH CV ECHO MEAS - ESV(MOD-SP2): 38 ML
BH CV ECHO MEAS - ESV(MOD-SP4): 40 ML
BH CV ECHO MEAS - FS: 38.9 %
BH CV ECHO MEAS - IVS/LVPW: 1.02 CM
BH CV ECHO MEAS - IVSD: 1.34 CM
BH CV ECHO MEAS - LAT PEAK E' VEL: 12.4 CM/SEC
BH CV ECHO MEAS - LV DIASTOLIC VOL/BSA (35-75): 46.6 CM2
BH CV ECHO MEAS - LV MASS(C)D: 244.6 GRAMS
BH CV ECHO MEAS - LV MAX PG: 1.57 MMHG
BH CV ECHO MEAS - LV MEAN PG: 0.82 MMHG
BH CV ECHO MEAS - LV SYSTOLIC VOL/BSA (12-30): 18.6 CM2
BH CV ECHO MEAS - LV V1 MAX: 62.6 CM/SEC
BH CV ECHO MEAS - LV V1 VTI: 15.2 CM
BH CV ECHO MEAS - LVIDD: 4.7 CM
BH CV ECHO MEAS - LVIDS: 2.9 CM
BH CV ECHO MEAS - LVOT AREA: 4.1 CM2
BH CV ECHO MEAS - LVOT DIAM: 2.29 CM
BH CV ECHO MEAS - LVPWD: 1.31 CM
BH CV ECHO MEAS - MED PEAK E' VEL: 9.6 CM/SEC
BH CV ECHO MEAS - MR MAX PG: 90.8 MMHG
BH CV ECHO MEAS - MR MAX VEL: 476.4 CM/SEC
BH CV ECHO MEAS - MV A DUR: 0.1 SEC
BH CV ECHO MEAS - MV A MAX VEL: 57.8 CM/SEC
BH CV ECHO MEAS - MV DEC SLOPE: 146.5 CM/SEC2
BH CV ECHO MEAS - MV DEC TIME: 0.15 MSEC
BH CV ECHO MEAS - MV E MAX VEL: 95.5 CM/SEC
BH CV ECHO MEAS - MV E/A: 1.65
BH CV ECHO MEAS - MV MAX PG: 2.7 MMHG
BH CV ECHO MEAS - MV MEAN PG: 1 MMHG
BH CV ECHO MEAS - MV P1/2T: 171 MSEC
BH CV ECHO MEAS - MV V2 VTI: 36.2 CM
BH CV ECHO MEAS - MVA(P1/2T): 1.29 CM2
BH CV ECHO MEAS - MVA(VTI): 1.73 CM2
BH CV ECHO MEAS - PA ACC TIME: 0.2 SEC
BH CV ECHO MEAS - PA PR(ACCEL): -11.3 MMHG
BH CV ECHO MEAS - PA V2 MAX: 85.5 CM/SEC
BH CV ECHO MEAS - PULM A REVS DUR: 0.15 SEC
BH CV ECHO MEAS - PULM A REVS VEL: 23.5 CM/SEC
BH CV ECHO MEAS - PULM DIAS VEL: 52.7 CM/SEC
BH CV ECHO MEAS - PULM S/D: 0.77
BH CV ECHO MEAS - PULM SYS VEL: 40.8 CM/SEC
BH CV ECHO MEAS - QP/QS: 0.61
BH CV ECHO MEAS - RAP SYSTOLE: 3 MMHG
BH CV ECHO MEAS - RV MAX PG: 0.72 MMHG
BH CV ECHO MEAS - RV V1 MAX: 42.4 CM/SEC
BH CV ECHO MEAS - RV V1 VTI: 12.3 CM
BH CV ECHO MEAS - RVOT DIAM: 1.99 CM
BH CV ECHO MEAS - RVSP: 27.4 MMHG
BH CV ECHO MEAS - SI(MOD-SP2): 30.8 ML/M2
BH CV ECHO MEAS - SI(MOD-SP4): 28 ML/M2
BH CV ECHO MEAS - SUP REN AO DIAM: 2.1 CM
BH CV ECHO MEAS - SV(LVOT): 62.5 ML
BH CV ECHO MEAS - SV(MOD-SP2): 66 ML
BH CV ECHO MEAS - SV(MOD-SP4): 60 ML
BH CV ECHO MEAS - SV(RVOT): 38.2 ML
BH CV ECHO MEAS - TAPSE (>1.6): 2.24 CM
BH CV ECHO MEAS - TR MAX PG: 24.4 MMHG
BH CV ECHO MEAS - TR MAX VEL: 247 CM/SEC
BH CV ECHO MEASUREMENTS AVERAGE E/E' RATIO: 8.68
BH CV NUCLEAR PRIOR STUDY: 2
BH CV REST NUCLEAR ISOTOPE DOSE: 11.6 MCI
BH CV STRESS BP STAGE 1: NORMAL
BH CV STRESS BP STAGE 2: NORMAL
BH CV STRESS DURATION MIN STAGE 1: 3
BH CV STRESS DURATION MIN STAGE 2: 3
BH CV STRESS DURATION MIN STAGE 3: 1
BH CV STRESS DURATION SEC STAGE 1: 0
BH CV STRESS DURATION SEC STAGE 2: 0
BH CV STRESS DURATION SEC STAGE 3: 0
BH CV STRESS GRADE STAGE 1: 10
BH CV STRESS GRADE STAGE 2: 12
BH CV STRESS GRADE STAGE 3: 14
BH CV STRESS HR STAGE 1: 90
BH CV STRESS HR STAGE 2: 96
BH CV STRESS HR STAGE 3: 102
BH CV STRESS METS STAGE 1: 5
BH CV STRESS METS STAGE 2: 7.5
BH CV STRESS METS STAGE 3: 10
BH CV STRESS NUCLEAR ISOTOPE DOSE: 35.6 MCI
BH CV STRESS PROTOCOL 1: NORMAL
BH CV STRESS PROTOCOL 2 BP STAGE 1: NORMAL
BH CV STRESS PROTOCOL 2 COMMENTS STAGE 1: NORMAL
BH CV STRESS PROTOCOL 2 DOSE REGADENOSON STAGE 1: 0.4
BH CV STRESS PROTOCOL 2 DURATION MIN STAGE 1: 0
BH CV STRESS PROTOCOL 2 DURATION SEC STAGE 1: 10
BH CV STRESS PROTOCOL 2 HR STAGE 1: 109
BH CV STRESS PROTOCOL 2 STAGE 1: 1
BH CV STRESS PROTOCOL 2: NORMAL
BH CV STRESS RECOVERY BP: NORMAL MMHG
BH CV STRESS RECOVERY HR: 76 BPM
BH CV STRESS SPEED STAGE 1: 1.7
BH CV STRESS SPEED STAGE 2: 2.5
BH CV STRESS SPEED STAGE 3: 3.4
BH CV STRESS STAGE 1: 1
BH CV STRESS STAGE 2: 2
BH CV STRESS STAGE 3: 3
BH CV XLRA - RV BASE: 2.9 CM
BH CV XLRA - RV LENGTH: 8.8 CM
BH CV XLRA - RV MID: 2.5 CM
BH CV XLRA - TDI S': 10.3 CM/SEC
LEFT ATRIUM VOLUME INDEX: 24.3 ML/M2
LV EF 2D ECHO EST: 62 %
LV EF NUC BP: 53 %
MAXIMAL PREDICTED HEART RATE: 166 BPM
MAXIMAL PREDICTED HEART RATE: 166 BPM
PERCENT MAX PREDICTED HR: 65.66 %
SINUS: 3.1 CM
STJ: 2.6 CM
STRESS BASELINE BP: NORMAL MMHG
STRESS BASELINE HR: 54 BPM
STRESS PERCENT HR: 77 %
STRESS POST EXERCISE DUR MIN: 7 MIN
STRESS POST PEAK BP: NORMAL MMHG
STRESS POST PEAK HR: 109 BPM
STRESS TARGET HR: 141 BPM
STRESS TARGET HR: 141 BPM

## 2022-09-08 PROCEDURE — 93017 CV STRESS TEST TRACING ONLY: CPT

## 2022-09-08 PROCEDURE — A9502 TC99M TETROFOSMIN: HCPCS | Performed by: NURSE PRACTITIONER

## 2022-09-08 PROCEDURE — 0 TECHNETIUM TETROFOSMIN KIT: Performed by: NURSE PRACTITIONER

## 2022-09-08 PROCEDURE — 93306 TTE W/DOPPLER COMPLETE: CPT

## 2022-09-08 PROCEDURE — 25010000002 REGADENOSON 0.4 MG/5ML SOLUTION: Performed by: NURSE PRACTITIONER

## 2022-09-08 PROCEDURE — 93306 TTE W/DOPPLER COMPLETE: CPT | Performed by: INTERNAL MEDICINE

## 2022-09-08 PROCEDURE — 78452 HT MUSCLE IMAGE SPECT MULT: CPT | Performed by: INTERNAL MEDICINE

## 2022-09-08 PROCEDURE — 93018 CV STRESS TEST I&R ONLY: CPT | Performed by: INTERNAL MEDICINE

## 2022-09-08 PROCEDURE — 78452 HT MUSCLE IMAGE SPECT MULT: CPT

## 2022-09-08 PROCEDURE — 93016 CV STRESS TEST SUPVJ ONLY: CPT | Performed by: INTERNAL MEDICINE

## 2022-09-08 RX ADMIN — REGADENOSON 0.4 MG: 0.08 INJECTION, SOLUTION INTRAVENOUS at 09:15

## 2022-09-08 RX ADMIN — TETROFOSMIN 1 DOSE: 1.38 INJECTION, POWDER, LYOPHILIZED, FOR SOLUTION INTRAVENOUS at 08:24

## 2022-09-08 RX ADMIN — TETROFOSMIN 1 DOSE: 1.38 INJECTION, POWDER, LYOPHILIZED, FOR SOLUTION INTRAVENOUS at 09:15

## 2022-09-09 NOTE — PROGRESS NOTES
Discussed stress test and echo results with Dr. Randolph who read nuclear stress test.  He feels that the echo is a much more accurate representation of RV and LV cavity size, and they look normal on echo.

## 2022-09-09 NOTE — PROGRESS NOTES
PATIENT CALLED.  LEFT VOICEMAIL, OK PER VERBAL RELEASE FORM ON FILE.  Patient to slowly increase activity levels and work on healthy weight loss and call if symptoms worsen or fail to improve.  Otherwise keep February follow-up as scheduled.  Call for additional questions or concerns.

## 2022-09-17 ENCOUNTER — TELEMEDICINE (OUTPATIENT)
Dept: FAMILY MEDICINE CLINIC | Facility: TELEHEALTH | Age: 52
End: 2022-09-17

## 2022-09-17 DIAGNOSIS — J06.9 ACUTE URI: Primary | ICD-10-CM

## 2022-09-17 PROCEDURE — U0004 COV-19 TEST NON-CDC HGH THRU: HCPCS | Performed by: PHYSICIAN ASSISTANT

## 2022-09-17 PROCEDURE — 99213 OFFICE O/P EST LOW 20 MIN: CPT | Performed by: NURSE PRACTITIONER

## 2022-09-17 RX ORDER — METHYLPREDNISOLONE 4 MG/1
TABLET ORAL
Qty: 1 EACH | Refills: 0 | Status: SHIPPED | OUTPATIENT
Start: 2022-09-17 | End: 2022-12-21

## 2022-09-17 NOTE — PROGRESS NOTES
You have chosen to receive care through a telehealth visit.The use of a video visit has been reviewed with the patient and verbal informed consent has been obtained. Video Options: MyChart/Zoom The visit included audio and video interaction. No technical issues occurred during this visit.  Pt Location: Home  Provider location: [place]  Video Visit Reason:   Free Text Description: Nasal/sinus congestion. Head ache, sore throat    Chief Complaint  Nasal Congestion and Headache    Subjective          Matthew Morris presents to [facility]  History of Present Illness  Nasal congestion, sinus pressure, headache and sore throat for the last 24 hours that has worsened. Some symptoms started on Wednesday night but thought were seasonal allergy related. He denies fever, chills, shortness of breath.     Objective   Vital Signs:   There were no vitals taken for this visit.    Physical Exam   Pulmonary/Chest: Effort normal.  No respiratory distress.  Neurological: He is alert.     Result Review :       Data reviewed: last encounters and cardiologist encounter          Assessment and Plan    Diagnoses and all orders for this visit:    1. Acute URI (Primary)  -     COVID-19,APTIMA PANTHER(KATERINA),[facility], NP/OP SWAB IN UTM/VTM/[facility] TRANSPORT MEDIA,24 HR TAT - Swab, Nasopharynx; Future  -     methylPREDNISolone (MEDROL) 4 MG dose pack; Take as directed on package instructions.  Dispense: 1 each; Refill: 0        Claritin daily, stay on flonase. If covid positive, discussed Paxlovid.      I spent 20 minutes caring for Matthew on this date of service. This time includes time spent by me in the following activities:preparing for the visit, obtaining and/or reviewing a separately obtained history, performing a medically appropriate examination and/or evaluation , counseling and educating the patient/family/caregiver, ordering medications, tests, or procedures, and documenting information  in the medical record  Follow Up   Return if symptoms worsen or fail to improve in 7 days, for Recheck.  Patient was given instructions and counseling regarding his condition or for health maintenance advice. Please see specific information pulled into the AVS if appropriate.

## 2022-09-17 NOTE — PATIENT INSTRUCTIONS
Order has been placed for SARS-CoV-2 (Coronavirus) test to be done at your nearest [facility]. You don't need to call the Urgent Care, just let them know when you arrive that you have been assessed by a Virtual Care Provider and that you have an order for testing. We will call with results when they are available. The results will be immediately released to your [employer] [facility] and you will receive a message from [employer] to view your result. Since we are mandated to call all results to the patients, you will receive a call from a blocked number. If you do not answer, you will be sent a message after the first attempted call to notify you that your test results are available. Continue to treat your symptoms just as you would for any viral illness. Take Tylenol for pain and/or fever,  Stay hydrated, rest and treat cough with over-the-counter cough syrup such as Robitussin. You will need to Self Quarantine until the following criteria has been met: (Nonhealthcare workers only) If you are a HCW, follow your Employee Health recommendations  ?At least five days have passed since symptoms first appeared AND    ?At least one day (24 hours) has passed since resolution of fever without the use of fever-reducing medications AND     ?There is improvement in symptoms (eg, cough, shortness of breath)    After discontinuing home isolation, patients should continue to wear a well-fitting mask around others. The total duration of isolation plus strict masking is 10 days. During this time, patients should avoid people who are immunocompromised or at high risk for severe disease. Additional information on restrictions (eg, travel) after the five-day isolation period can be found on the CDC website.

## 2023-04-02 NOTE — PROGRESS NOTES
Date of Office Visit: 2023  Encounter Provider: Christine Carson MD  Place of Service: [facility] CARDIOLOGY  Patient Name: Matthew Morris  :1970    Chief complaint  Transient cardiomyopathy, coronary artery disease, hypertension    History of Present Illness  Patient is a very pleasant 52-year-old gentleman who was transferred from [facility] on 2016 after chest pain with an elevated troponin consistent with non-ST elevation myocardial infarction.  Echocardiogram revealed an ejection fraction 40% and a catheterization revealed severe disease of the right coronary artery.  He received a drug-coated stent.  He has had intermittent chest pain since then in the setting of hypertension.  He was admitted on  for such an occurrence he ruled out for myocardial infarction.  An echocardiogram revealed his ejection fraction had improved to 50% with mild to moderate mitral regurgitation. 2017 he complained of brief palpitations and chest pain that was atypical.  Ziopatch showed no arrhythmia.  On 2022 with complaints of shortness of breath echo showed an ejection fraction of 62%, normal diastolic function, trivial valve regurgitation, normal right-sided pressures.  A stress perfusion study was negative for ischemia though there was GI artifact and there was biventricular enlargement.  It was felt to be a low risk study.  The patient had attempted to exercise but did not achieve maximal heart rate therefore Lexiscan was administered.    Patient has been using his CPAP consistently.  He is walking twice a week.  He believes he has had few palpitations.  He has no dyspnea he has had more persistent edema.  He had 2 episodes of dizziness and 1 episode of severe chest pain.    [facility]       Past Medical History:   Diagnosis Date   • Angina pectoris    • Aortic calcification    • Arthritis    • CAD (coronary  artery disease)     NSTEMI 4/2016: cath with 99% RCA s/p 3.5x12mm WENDIE, 30% ostial LCx, normal LM/LAD   • Cervical radiculopathy    • Chest pain    • Coronary artery disease involving native coronary artery of native heart without angina pectoris    • HERNÁNDEZ (dyspnea on exertion)    • Essential hypertension    • Headache    • Hyperlipidemia    • Hypertension    • Joint pain    • Joint swelling    • Knee pain    • Leg swelling    • Malaise and fatigue    • Moderate mitral regurgitation     by echo 4/2016   • Neck pain    • Palpitations    • Pinched nerve in neck 2016   • Precordial pain    • SOB (shortness of breath)      Past Surgical History:   Procedure Laterality Date   • CARDIAC CATHETERIZATION Left 4/28/2016    Procedure: Cardiac catheterization;  Surgeon: Linda Slaughter MD;  Location:  [facility] CATH INVASIVE [facility];  Service:    • CARDIAC CATHETERIZATION N/A 4/28/2016    Procedure: Left ventriculography;  Surgeon: Linda Slaughter MD;  Location:  [facility] CATH INVASIVE [facility];  Service:    • CORONARY ANGIOPLASTY WITH STENT PLACEMENT     • CYST REMOVAL  1997    right testicle, benign   • HERNIA REPAIR     • HYDROCELE EXCISION / REPAIR  1997     Outpatient Medications Prior to Visit   Medication Sig Dispense Refill   • aspirin 81 MG EC tablet Take 1 tablet by mouth Daily.     • atorvastatin (LIPITOR) 80 MG tablet Take 1 tablet by mouth daily. 90 tablet 3   • benzonatate (TESSALON) 200 MG capsule Take 1 capsule by mouth 3 (Three) Times a Day As Needed for Cough. 18 capsule 0   • clopidogrel (PLAVIX) 75 MG tablet Take 1 tablet by mouth Daily. 90 tablet 1   • cyclobenzaprine (FLEXERIL) 10 MG tablet Take 1 tablet by mouth As Needed.     • fluticasone (Flonase) 50 MCG/ACT nasal spray 2 sprays into the nostril(s) as directed by provider Daily. 16 mL 0   • MEGARED OMEGA-3 KRILL  MG capsule Take  by mouth Daily.     • Metoprolol Tartrate 75 MG tablet Take 75 mg by mouth 2 (Two) Times a Day. 180 tablet 3   • amLODIPine  "(NORVASC) 2.5 MG tablet Take 1 tablet by mouth Every 12 (Twelve) Hours. 180 tablet 3   • lisinopril-hydrochlorothiazide (PRINZIDE,ZESTORETIC) 20-25 MG per tablet Take 1 tablet by mouth Daily. 90 tablet 3   • methylPREDNISolone (MEDROL) 4 MG dose pack Take as directed on package instructions. (Patient not taking: Reported on 4/3/2023) 21 tablet 0   • promethazine-dextromethorphan (PROMETHAZINE-DM) 6.25-15 MG/5ML syrup Take 5 mL by mouth 4 (Four) Times a Day As Needed for Cough. (Patient not taking: Reported on 4/3/2023) 118 mL 0     No facility-administered medications prior to visit.       Allergies as of 04/03/2023   • (No Known Allergies)     Social History     Socioeconomic History   • Marital status:    Tobacco Use   • Smoking status: Never   • Smokeless tobacco: Never   Vaping Use   • Vaping Use: Never used   Substance and Sexual Activity   • Alcohol use: No     Alcohol/week: 0.0 standard drinks     Comment: Daily caffeine use   • Drug use: Never   • Sexual activity: Defer     Family History   Problem Relation Age of Onset   • Heart disease Mother    • Heart disease Father      Review of Systems   Constitutional: Negative for chills, fever, weight gain and weight loss.   Cardiovascular: Positive for leg swelling.   Respiratory: Negative for cough, snoring and wheezing.    Hematologic/Lymphatic: Negative for bleeding problem. Does not bruise/bleed easily.   Skin: Negative for color change.   Musculoskeletal: Positive for joint pain. Negative for falls and myalgias.   Gastrointestinal: Negative for melena.   Genitourinary: Negative for hematuria.   Neurological: Negative for excessive daytime sleepiness.   Psychiatric/Behavioral: Negative for depression. The patient is not nervous/anxious.         Objective:     Vitals:    04/03/23 0745   BP: 114/74   Pulse: 56   Weight: 105 kg (232 lb)   Height: 167.6 cm (66\")     Body mass index is 37.45 kg/m².    Vitals reviewed.   Constitutional:       Appearance: " Well-developed. Obese.   Eyes:      General: No scleral icterus.        Right eye: No discharge.      Conjunctiva/sclera: Conjunctivae normal.      Pupils: Pupils are equal, round, and reactive to light.   HENT:      Head: Normocephalic.      Nose: Nose normal.   Neck:      Thyroid: No thyromegaly.      Vascular: No JVD.   Pulmonary:      Effort: Pulmonary effort is normal. No respiratory distress.      Breath sounds: Normal breath sounds. No wheezing. No rales.   Cardiovascular:      Normal rate. Regular rhythm. Normal S1. Normal S2.      Murmurs: There is no murmur.      No gallop.   Pulses:     Intact distal pulses.   Edema:     Peripheral edema absent.   Abdominal:      General: Bowel sounds are normal. There is no distension.      Palpations: Abdomen is soft.      Tenderness: There is no abdominal tenderness. There is no rebound.   Musculoskeletal: Normal range of motion.         General: No tenderness.      Cervical back: Normal range of motion and neck supple. Skin:     General: Skin is warm and dry.      Findings: No erythema or rash.   Neurological:      Mental Status: Alert and oriented to person, place, and time.   Psychiatric:         Behavior: Behavior normal.         Thought Content: Thought content normal.         Judgment: Judgment normal.       Lab Review:     ECG 12 Lead    Date/Time: 4/15/2023 9:58 AM  Performed by: Christine Carson MD  Authorized by: Christine Carson MD   Comparison: compared with previous ECG   Similar to previous ECG  Rhythm: sinus bradycardia  Other findings: non-specific ST-T wave changes  Other findings comments: Early transition across the precordial leads    Clinical impression: abnormal EKG          Assessment:       Diagnosis Plan   1. Palpitations        2. Essential hypertension  amLODIPine (NORVASC) 2.5 MG tablet      3. Primary hypertension        4. Mixed hyperlipidemia        5. Coronary artery disease involving native coronary artery of native heart without angina  pectoris        6. EVON (obstructive sleep apnea)          Plan:       1   Palpitations.  Very infrequent at this time.  2.  Chest pain.  He had 1 episode of severe pain that occurred while he was sitting still and lasted few seconds and occurred a month ago when he was driving and under extreme stress.  It is not exertional and does not occur when he is walking.  3.  Edema, worse though remains dependent and not evident on exam today.  Await blood work results as well [facility] which [facility] will [facility] to me.  We will try to decrease amlodipine by half and increase lisinopril to 40/25 mg a day.  4.  Coronary artery disease, non-ST elevation myocardial infarction, status post WENDIE to RCA in 2016.  Negative stress test and excellent workload in 9/2022.  5.  Hypertension.  As above.  Modifying meds to address edema especially with the upcoming summer months.  6.  Hyperlipidemia, controlled and repeat labs in 8/2021  7.  Obstructive sleep apnea.  On CPAP therapy.    Time Spent: I spent 35 minutes caring for Matthew on this date of service. This time includes time spent by me in the following activities: preparing for the visit, reviewing tests, obtaining and/or reviewing a separately obtained history, performing a medically appropriate examination and/or evaluation, counseling and educating the patient/family/caregiver, ordering medications, tests, or procedures, documenting information in the medical record and independently interpreting results and communicating that information with the patient/family/caregiver.   I spent 1 minutes on the separately reported service of ECG. This time is not included in the time used to support the E/M service also reported today.        Your medication list          Accurate as of April 3, 2023 11:59 PM. If you have any questions, ask your nurse or doctor.            START taking these medications      Instructions Last Dose Given Next Dose Due    lisinopril-hydrochlorothiazide 20-12.5 MG per tablet  Commonly known as: PRINZIDE,ZESTORETIC  Replaces: lisinopril-hydrochlorothiazide 20-25 MG per tablet  Started by: Christine Carson MD      Take 2 tablets by mouth Daily.          CHANGE how you take these medications      Instructions Last Dose Given Next Dose Due   amLODIPine 2.5 MG tablet  Commonly known as: NORVASC  What changed: when to take this  Changed by: Christine Carson MD      Take 1 tablet by mouth Daily.          CONTINUE taking these medications      Instructions Last Dose Given Next Dose Due   aspirin 81 MG EC tablet      Take 1 tablet by mouth Daily.       atorvastatin 80 MG tablet  Commonly known as: LIPITOR      Take 1 tablet by mouth daily.       benzonatate 200 MG capsule  Commonly known as: TESSALON      Take 1 capsule by mouth 3 (Three) Times a Day As Needed for Cough.       clopidogrel 75 MG tablet  Commonly known as: PLAVIX      Take 1 tablet by mouth Daily.       cyclobenzaprine 10 MG tablet  Commonly known as: FLEXERIL      Take 1 tablet by mouth As Needed.       fluticasone 50 MCG/ACT nasal spray  Commonly known as: Flonase      2 sprays into the nostril(s) as directed by provider Daily.       MegaRed Omega-3 Krill Oil 500 MG capsule      Take  by mouth Daily.       Metoprolol Tartrate 75 MG tablet      Take 75 mg by mouth 2 (Two) Times a Day.          STOP taking these medications    lisinopril-hydrochlorothiazide 20-25 MG per tablet  Commonly known as: PRINZIDE,ZESTORETIC  Replaced by: lisinopril-hydrochlorothiazide 20-12.5 MG per tablet  Stopped by: Christine Carson MD              Where to Get Your Medications      These medications were sent to [id number] - [place], [place] - [address] AT [facility] OF [address] [facility] & [place] - [phone]  - [phone] FX  [address] [place] [address]    Phone: [phone]   · amLODIPine 2.5 MG tablet  · lisinopril-hydrochlorothiazide 20-12.5 MG per tablet         Patient  is no longer taking -.  I corrected the med list to reflect this.  I did not stop these medications.      Dictated utilizing Dragon dictation

## 2023-04-03 ENCOUNTER — OFFICE VISIT (OUTPATIENT)
Dept: CARDIOLOGY | Facility: CLINIC | Age: 53
End: 2023-04-03
Payer: OTHER GOVERNMENT

## 2023-04-03 ENCOUNTER — TELEPHONE (OUTPATIENT)
Dept: CARDIOLOGY | Facility: CLINIC | Age: 53
End: 2023-04-03

## 2023-04-03 VITALS
BODY MASS INDEX: 37.28 KG/M2 | WEIGHT: 232 LBS | DIASTOLIC BLOOD PRESSURE: 74 MMHG | SYSTOLIC BLOOD PRESSURE: 114 MMHG | HEART RATE: 56 BPM | HEIGHT: 66 IN

## 2023-04-03 DIAGNOSIS — G47.33 OSA (OBSTRUCTIVE SLEEP APNEA): ICD-10-CM

## 2023-04-03 DIAGNOSIS — R00.2 PALPITATIONS: Primary | ICD-10-CM

## 2023-04-03 DIAGNOSIS — I25.10 CORONARY ARTERY DISEASE INVOLVING NATIVE CORONARY ARTERY OF NATIVE HEART WITHOUT ANGINA PECTORIS: ICD-10-CM

## 2023-04-03 DIAGNOSIS — I10 ESSENTIAL HYPERTENSION: ICD-10-CM

## 2023-04-03 DIAGNOSIS — E78.2 MIXED HYPERLIPIDEMIA: ICD-10-CM

## 2023-04-03 DIAGNOSIS — I10 PRIMARY HYPERTENSION: ICD-10-CM

## 2023-04-03 PROCEDURE — 99214 OFFICE O/P EST MOD 30 MIN: CPT | Performed by: INTERNAL MEDICINE

## 2023-04-03 RX ORDER — AMLODIPINE BESYLATE 2.5 MG/1
2.5 TABLET ORAL DAILY
Qty: 90 TABLET | Refills: 3 | Status: SHIPPED | OUTPATIENT
Start: 2023-04-03

## 2023-04-03 RX ORDER — LISINOPRIL AND HYDROCHLOROTHIAZIDE 20; 12.5 MG/1; MG/1
2 TABLET ORAL DAILY
Qty: 180 TABLET | Refills: 3 | Status: SHIPPED | OUTPATIENT
Start: 2023-04-03

## 2023-04-10 ENCOUNTER — TELEPHONE (OUTPATIENT)
Dept: CARDIOLOGY | Facility: CLINIC | Age: 53
End: 2023-04-10
Payer: OTHER GOVERNMENT

## 2023-04-10 NOTE — TELEPHONE ENCOUNTER
"----- Message from Idalia Cortez MA sent at 4/4/2023  7:16 AM EDT -----  Regarding: FW: Lab work  Contact: [phone]    ----- Message -----  From: Matthew Morris \"Mr Morris\"  Sent: 4/3/2023   4:13 PM EDT  To: Kristin Beauchamp [facility]  Subject: Lab work                                         As requested             "

## 2023-04-15 PROCEDURE — 93000 ELECTROCARDIOGRAM COMPLETE: CPT | Performed by: INTERNAL MEDICINE

## 2023-10-17 ENCOUNTER — OFFICE VISIT (OUTPATIENT)
Dept: CARDIOLOGY | Facility: CLINIC | Age: 53
End: 2023-10-17
Payer: OTHER GOVERNMENT

## 2023-10-17 VITALS
HEART RATE: 57 BPM | SYSTOLIC BLOOD PRESSURE: 132 MMHG | DIASTOLIC BLOOD PRESSURE: 90 MMHG | BODY MASS INDEX: 37.93 KG/M2 | HEIGHT: 66 IN | WEIGHT: 236 LBS

## 2023-10-17 DIAGNOSIS — G47.33 OSA (OBSTRUCTIVE SLEEP APNEA): ICD-10-CM

## 2023-10-17 DIAGNOSIS — I25.2 HISTORY OF NON-ST ELEVATION MYOCARDIAL INFARCTION (NSTEMI): ICD-10-CM

## 2023-10-17 DIAGNOSIS — I10 ESSENTIAL HYPERTENSION: ICD-10-CM

## 2023-10-17 DIAGNOSIS — E78.2 MIXED HYPERLIPIDEMIA: ICD-10-CM

## 2023-10-17 DIAGNOSIS — R60.0 BILATERAL LEG EDEMA: ICD-10-CM

## 2023-10-17 DIAGNOSIS — I25.10 CORONARY ARTERY DISEASE INVOLVING NATIVE CORONARY ARTERY OF NATIVE HEART WITHOUT ANGINA PECTORIS: ICD-10-CM

## 2023-10-17 DIAGNOSIS — R00.2 PALPITATIONS: Primary | ICD-10-CM

## 2023-10-17 PROCEDURE — 93000 ELECTROCARDIOGRAM COMPLETE: CPT | Performed by: NURSE PRACTITIONER

## 2023-10-17 PROCEDURE — 99214 OFFICE O/P EST MOD 30 MIN: CPT | Performed by: NURSE PRACTITIONER

## 2023-10-17 NOTE — PROGRESS NOTES
Date of Office Visit: 10/17/2023  Encounter Provider: CELI Mendoza  Place of Service: [facility] CARDIOLOGY  Patient Name: Matthew Morris  :1970    Chief complaint  Follow-up CAD, mitral regurgitation, palpitations    History of Present Illness  Patient is a 53-year-old male patient of Dr. Carson  who was transferred from [facility] on 2016 after chest pain with an elevated troponin consistent with non-ST elevation myocardial infarction.  Echocardiogram revealed an ejection fraction 40% and a catheterization revealed severe disease of the right coronary artery.  He received a drug-coated stent.  He has had intermittent chest pain since then in the setting of hypertension.  He was admitted on  for such an occurrence he ruled out for myocardial infarction.  An echocardiogram revealed his ejection fraction had improved to 50% with mild to moderate mitral regurgitation. 2017 he complained of brief palpitations and chest pain that was atypical.  Ziopatch showed no arrhythmia.  On 2022 with complaints of shortness of breath echo showed an ejection fraction of 62%, normal diastolic function, trivial valve regurgitation, normal right-sided pressures.  A stress perfusion study was negative for ischemia though there was GI artifact and there was biventricular enlargement.  It was felt to be a low risk study.  At last visit he complained of 2 episodes of dizziness and 1 episode of severe chest pain.  Chest pain was thought to be stress related as it occurred at rest, when he was driving and under extreme stress.  Pain had not recurred at time of appointment.    Interval history  Patient presents today for routine follow-up.  I will visit with him for the first time today and have reviewed his medical record.  Since last visit he is using CPAP consistently.  He denies shortness of breath, chest pain or chest pressure, syncope or presyncope.  He  states that palpitations have improved.  He continues to report dependent edema.  He also reports dizziness and fatigue that are stable and at his baseline.  Blood pressure today is slightly elevated though he reports readings at home are much better controlled and usually less than 130/80 on average, though he is only checking rarely.  He is active and walking daily and denies exertional symptoms.    Past Medical History:   Diagnosis Date    Angina pectoris     Aortic calcification 2008    Arthritis     CAD (coronary artery disease)     NSTEMI 4/2016: cath with 99% RCA s/p 3.5x12mm WENDIE, 30% ostial LCx, normal LM/LAD    Cervical radiculopathy     Chest pain     Coronary artery disease involving native coronary artery of native heart without angina pectoris     HERNÁNDEZ (dyspnea on exertion)     Essential hypertension     Headache     Hyperlipidemia     Hypertension     Joint pain     Joint swelling     Knee pain     Leg swelling     Malaise and fatigue     Moderate mitral regurgitation     by echo 4/2016    Neck pain     Palpitations     Pinched nerve in neck 2016    Precordial pain     SOB (shortness of breath)      Past Surgical History:   Procedure Laterality Date    CARDIAC CATHETERIZATION Left 4/28/2016    Procedure: Cardiac catheterization;  Surgeon: Linda Slaughter MD;  Location: [facility] CATH INVASIVE LOCATION;  Service:     CARDIAC CATHETERIZATION N/A 4/28/2016    Procedure: Left ventriculography;  Surgeon: Linda Slaughter MD;  Location: [facility] CATH INVASIVE LOCATION;  Service:     CORONARY ANGIOPLASTY WITH STENT PLACEMENT      CYST REMOVAL  1997    right testicle, benign    HERNIA REPAIR      HYDROCELE EXCISION / REPAIR  1997     Outpatient Medications Prior to Visit   Medication Sig Dispense Refill    amLODIPine (NORVASC) 2.5 MG tablet Take 1 tablet by mouth Daily. 90 tablet 3    aspirin 81 MG EC tablet Take 1 tablet by mouth Daily.      atorvastatin (LIPITOR) 80 MG tablet Take 1 tablet by mouth daily. 90 tablet  "3    benzonatate (TESSALON) 200 MG capsule Take 1 capsule by mouth 3 (Three) Times a Day As Needed for Cough. 18 capsule 0    clopidogrel (PLAVIX) 75 MG tablet Take 1 tablet by mouth Daily. 90 tablet 1    cyclobenzaprine (FLEXERIL) 10 MG tablet Take 1 tablet by mouth As Needed.      fluticasone (Flonase) 50 MCG/ACT nasal spray 2 sprays into the nostril(s) as directed by provider Daily. 16 mL 0    lisinopril-hydrochlorothiazide (PRINZIDE,ZESTORETIC) 20-12.5 MG per tablet Take 2 tablets by mouth Daily. 180 tablet 3    MEGARED OMEGA-3 KRILL  MG capsule Take  by mouth Daily.      Metoprolol Tartrate 75 MG tablet Take 75 mg by mouth 2 (Two) Times a Day. 180 tablet 3     No facility-administered medications prior to visit.       Allergies as of 10/17/2023    (No Known Allergies)     Social History     Socioeconomic History    Marital status:    Tobacco Use    Smoking status: Never    Smokeless tobacco: Never   Vaping Use    Vaping Use: Never used   Substance and Sexual Activity    Alcohol use: No     Alcohol/week: 0.0 standard drinks of alcohol     Comment: Daily caffeine use    Drug use: Never    Sexual activity: Defer     Family History   Problem Relation Age of Onset    Heart disease Mother     Heart disease Father      Review of Systems   Constitutional: Positive for malaise/fatigue.   Cardiovascular:  Positive for leg swelling and palpitations. Negative for chest pain, claudication, dyspnea on exertion, near-syncope, orthopnea, paroxysmal nocturnal dyspnea and syncope.   Respiratory:  Negative for shortness of breath.    Neurological:  Positive for dizziness. Negative for brief paralysis, headaches and light-headedness.   All other systems reviewed and are negative.       Objective:     Vitals:    10/17/23 1046   BP: 132/90   Pulse: 57   Weight: 107 kg (236 lb)   Height: 167.6 cm (66\")     Body mass index is 38.09 kg/m².    Vitals reviewed.   Constitutional:       General: Not in acute distress.     " "Appearance: Well-developed and not in distress. Not diaphoretic.   HENT:      Head: Normocephalic.   Pulmonary:      Effort: Pulmonary effort is normal. No respiratory distress.      Breath sounds: Normal breath sounds. No wheezing. No rhonchi. No rales.   Cardiovascular:      Normal rate. Regular rhythm.      Murmurs: There is no murmur.   Pulses:     Radial: 2+ bilaterally.  Edema:     Peripheral edema absent.   Skin:     General: Skin is warm and dry. There is no cyanosis.      Findings: No rash.   Neurological:      Mental Status: Alert and oriented to person, place, and time.   Psychiatric:         Behavior: Behavior normal. Behavior is cooperative.         Thought Content: Thought content normal.         Judgment: Judgment normal.       Lab Review:     Lab Results   Component Value Date     04/19/2021     08/31/2020    K 4.4 04/19/2021    K 3.8 08/31/2020     04/19/2021     08/31/2020    CO2 30 04/19/2021    CO2 29 08/31/2020    BUN 10 04/19/2021    BUN 14 08/31/2020    CREATININE 0.90 04/19/2021    CREATININE 1.22 (H) 08/31/2020    EGFRIFAFRI 91 06/03/2016    EGFRIFAFRI 92 04/29/2016    GLUCOSE 113 (H) 04/19/2021    GLUCOSE 106 (H) 08/31/2020    CALCIUM 9.8 04/19/2021    CALCIUM 10.1 08/31/2020    ALBUMIN 4.1 04/19/2021    ALBUMIN 4.0 08/31/2020    BILITOT 0.64 04/19/2021    BILITOT 0.37 08/31/2020    AST 20 04/19/2021    AST 20 08/31/2020    ALT 30 04/19/2021    ALT 28 08/31/2020     Lab Results   Component Value Date    WBC 6.13 04/19/2021    WBC 5.10 08/30/2020    HGB 13.6 (L) 04/19/2021    HGB 14.8 08/30/2020    HCT 42.5 04/19/2021    HCT 46.3 08/30/2020    MCV 84.7 04/19/2021    MCV 85.4 08/30/2020     04/19/2021     08/30/2020     No results found for: \"PROBNP\", \"BNP\"  Lab Results   Component Value Date    CKTOTAL 209 08/30/2020    TROPONINT <0.01 08/31/2020     No results found for: \"TSH\"          ECG 12 Lead    Date/Time: 10/17/2023 10:52 AM  Performed by: Nancy, " CELI Uriarte    Authorized by: Elaina Cruz APRN  Comparison: compared with previous ECG   Similar to previous ECG  Rhythm: sinus rhythm  Rate: normal  BPM: 61  QRS axis: normal  Comments: Similar to prior.  No new ischemic changes        Assessment:       Diagnosis Plan   1. Palpitations        2. Essential hypertension        3. Coronary artery disease involving native coronary artery of native heart without angina pectoris        4. EVON (obstructive sleep apnea)        5. Mixed hyperlipidemia        6. History of non-ST elevation myocardial infarction (NSTEMI)        7. Bilateral leg edema          Plan:       1   Palpitations.  Very infrequent at this time. Discussed avoiding stimulants such as caffeine, alcohol, chocolate, and stimulant medications as well.  2.  Chest pain.  He had 1 episode of severe pain that occurred while he was sitting still and lasted few seconds and occurred a month ago when he was driving and under extreme stress.  It is not exertional and does not occur when he is walking.  This has not recurred since last appointment.  He has no anginal symptoms at visit today.  3.  Edema.  This was worse at last appointment and amlodipine was decreased to 2.5 mg daily and lisinopril-HCTZ increased to 40-25 mg daily.  He continues to complain of mild dependent edema by the end of the day that resolves overnight.  Discussed low-sodium diet with him today as well as use of compression stockings to aid in controlling edema.  4.  Coronary artery disease, non-ST elevation myocardial infarction, status post WENDIE to RCA in 2016.  Negative stress test with excellent workload in 9/2022.  5.  Hypertension.  As above. Blood pressure mildly elevated in office but appears better at home.   6.  Hyperlipidemia, managed by PCP.  Appeared at goal on most recent labs  7.  Obstructive sleep apnea.  On CPAP therapy.      Time Spent: I spent 30 minutes caring for Matthew on this date of service. This time  includes time spent by me in the following activities: preparing for the visit, reviewing tests, performing a medically appropriate examination and/or evaluation, counseling and educating the patient/family/caregiver, ordering medications, tests, or procedures, and documenting information in the medical record.   I spent 1 minutes on the separately reported service of ECG. This time is not included in the time used to support the E/M service also reported today.        Your medication list            Accurate as of October 17, 2023 10:53 AM. If you have any questions, ask your nurse or doctor.                CONTINUE taking these medications        Instructions Last Dose Given Next Dose Due   amLODIPine 2.5 MG tablet  Commonly known as: NORVASC      Take 1 tablet by mouth Daily.       aspirin 81 MG EC tablet      Take 1 tablet by mouth Daily.       atorvastatin 80 MG tablet  Commonly known as: LIPITOR      Take 1 tablet by mouth daily.       benzonatate 200 MG capsule  Commonly known as: TESSALON      Take 1 capsule by mouth 3 (Three) Times a Day As Needed for Cough.       clopidogrel 75 MG tablet  Commonly known as: PLAVIX      Take 1 tablet by mouth Daily.       cyclobenzaprine 10 MG tablet  Commonly known as: FLEXERIL      Take 1 tablet by mouth As Needed.       fluticasone 50 MCG/ACT nasal spray  Commonly known as: Flonase      2 sprays into the nostril(s) as directed by provider Daily.       lisinopril-hydrochlorothiazide 20-12.5 MG per tablet  Commonly known as: PRINZIDE,ZESTORETIC      Take 2 tablets by mouth Daily.       MegaRed Omega-3 Krill Oil 500 MG capsule      Take  by mouth Daily.       Metoprolol Tartrate 75 MG tablet      Take 75 mg by mouth 2 (Two) Times a Day.                Patient is no longer taking -.  I corrected the med list to reflect this.  I did not stop these medications.      Dictated utilizing Dragon dictation

## 2023-12-27 NOTE — PROGRESS NOTES
Date of Office Visit: 2017  Encounter Provider: Christine Carson MD  Place of Service: [facility] CARDIOLOGY  Patient Name: Matthew Morris  :1970    Chief complaint  Followup of  hypertension, coronary artery disease and evaluation os chest pain, palpitations, dyspnea    History of Present Illness  Patient is a very pleasant 46-year-old gentleman who was transferred from [facility] on  after chest pain with an elevated troponin consistent with non-ST elevation microinfarction.  Cardiogram revealed an ejection fraction 40% and a catheterization revealed severe disease of the right coronary artery.  He received a drug-coated stent.  He has had intermittent chest pain since then in the setting of hypertension.  He was admitted on  for such an occurrence he ruled out for myocardial infarction.  An echocardiogram revealed his ejection fraction had improved to 50% with mild to moderate mitral regurgitation.    Since last visit he has had palpitations associated with chest pain and shortness of breath which occurs most often towards the evening hours while sitting.  It may last for 5 minutes to 20 minutes in duration.  The chest discomfort feels very similar to what he had prior to his stent.  This started in January of this year.  He admits to having had increased stresses with possible early senior living.  He still otherwise moderately active though limited by knee pain.  He denies any exertional chest pain shortness of breath.  He does note that the palpitations are slightly positional in nature.  They can occur 2-3 times a week.  He is using his CPAP consistently and has been checked and felt to be functioning well    Past Medical History   Diagnosis Date   • Aortic calcification    • Arthritis    • CAD (coronary artery disease)      NSTEMI 2016: cath with 99% RCA s/p 3.5x12mm WENDIE, 30% ostial LCx, normal LM/LAD   • Cervical radiculopathy    • Chest pain  Wound Healing Center Followup Visit Note    Referring Physician : Francis Camacho MD  [address] RECORD NUMBER:  [id number]  AGE: 66 y.o. GENDER: male  : 1952  EPISODE DATE:  2023    Subjective:     Chief Complaint   Patient presents with    Wound Check     Left lower leg/heel      HISTORY of PRESENT ILLNESS HPI   Janina Bauer is a 66 y.o. male who presents today in regards to follow up evaluation and treatment of wound/ulcer. That patient's past medical, family and social hx were reviewed and changes were made if present. History of Wound Context:  The patient has had a wound of both calves, minimal skin on the right side, multiple ulcers on the left calf, fat layer exposed which was first noted approximately 2023. This has been treated by the patient and his niece. On their initial visit to the wound healing center, 10/23/23, the patient has noted that the wound has not been improving. The patient has had similar previous wounds in the past.          Patient had lymphedema therapy in the past, with a lymphedema pump at home that the patient used to use on a regular basis, broke down,, is beyond repair and patient was recommended new lymphedema pump for which he was given the prescription     Pt is currently not on abx.          10/30/2023  Left calf wound looks same, repeat cultures were done today because of exudate, right calf skin ulcer healed, patient still waiting for lymphedema pump  2023  Left leg wounds, stable, patient was given prescription Augmentin 875-125 mg twice a day for 10 days for wound infection, as there is no clinical improvement  2023  Left lateral calf wound, stable to minimal improvement, has a ulcer on the medial aspect left ankle with some fat layer exposed  2023  Left lateral calf wound, improving slowly, with improvement of the left ankle ulcer  2023  Ulcer left medial ankle getting better, ulcers of the    • Coronary artery disease involving native coronary artery of native heart without angina pectoris    • Essential hypertension    • Headache    • Hyperlipidemia    • Hypertension    • Joint pain    • Joint swelling    • Knee pain    • Leg swelling    • Malaise and fatigue    • Moderate mitral regurgitation      by echo 4/2016   • MR (mitral regurgitation)    • Neck pain    • Palpitations    • Pinched nerve in neck 2016   • SOB (shortness of breath)      Past Surgical History   Procedure Laterality Date   • Hydrocele excision / repair  1997   • Cyst removal  1997     right testicle, benign   • Cardiac catheterization Left 4/28/2016     Procedure: Cardiac catheterization;  Surgeon: Linda Slaughter MD;  Location:  [facility] CATH INVASIVE LOCATION;  Service:    • Cardiac catheterization N/A 4/28/2016     Procedure: Left ventriculography;  Surgeon: Linda Slaughter MD;  Location: [facility] CATH INVASIVE LOC[facility];  Service:    • Coronary angioplasty with stent placement     • Hernia repair       Outpatient Medications Prior to Visit   Medication Sig Dispense Refill   • amLODIPine (NORVASC) 2.5 MG tablet TAKE 1 TABLET EVERY 12 HOURS 180 tablet 0   • aspirin 81 MG EC tablet Take 81 mg by mouth daily.     • atorvastatin (LIPITOR) 80 MG tablet Take 1 tablet by mouth daily. 90 tablet 3   • EFFIENT 10 MG tablet TAKE 1 TABLET DAILY 90 tablet 0   • gabapentin (NEURONTIN) 600 MG tablet Take 600 mg by mouth 3 (three) times a day     • lisinopril-hydrochlorothiazide (PRINZIDE,ZESTORETIC) 20-25 MG per tablet TAKE 1 TABLET DAILY 90 tablet 0   • metoprolol tartrate (LOPRESSOR) 50 MG tablet TAKE 1 TABLET TWICE A  tablet 0     No facility-administered medications prior to visit.      Allergies as of 02/23/2017   • (No Known Allergies)     Social History     Social History   • Marital status: Single     Spouse name: N/A   • Number of children: N/A   • Years of education: N/A     Occupational History   •   [employer]     Social History Main  "Topics   • Smoking status: Never Smoker   • Smokeless tobacco: Not on file   • Alcohol use 0.0 oz/week     0 Glasses of wine per week      Comment: occ   • Drug use: No   • Sexual activity: Defer     Other Topics Concern   • Not on file     Social History Narrative     Family History   Problem Relation Age of Onset   • Heart disease Mother    • Heart disease Father      Review of Systems   Constitution: Negative for fever, malaise/fatigue, weight gain and weight loss.   HENT: Negative for ear pain, hearing loss, nosebleeds and sore throat.    Eyes: Negative for double vision, pain, vision loss in left eye and vision loss in right eye.   Cardiovascular:        See history of present illness.   Respiratory: Positive for shortness of breath. Negative for cough, sleep disturbances due to breathing, snoring and wheezing.    Endocrine: Negative for cold intolerance, heat intolerance and polyuria.   Skin: Negative for itching, poor wound healing and rash.   Musculoskeletal: Negative for joint pain, joint swelling and myalgias.   Gastrointestinal: Negative for abdominal pain, diarrhea, hematochezia, nausea and vomiting.   Genitourinary: Negative for hematuria and hesitancy.   Neurological: Negative for numbness, paresthesias and seizures.   Psychiatric/Behavioral: Negative for depression. The patient is not nervous/anxious.      Objective:     Vitals:    02/23/17 0920   BP: 110/74   Pulse: 52   Weight: 213 lb 3.2 oz (96.7 kg)   Height: 67\" (170.2 cm)     Body mass index is 33.39 kg/(m^2).    Physical Exam   Constitutional: He is oriented to person, place, and time. He appears well-developed and well-nourished.   Obese   HENT:   Head: Normocephalic.   Nose: Nose normal.   Mouth/Throat: Oropharynx is clear and moist.   Eyes: Conjunctivae and EOM are normal. Pupils are equal, round, and reactive to light. Right eye exhibits no discharge. No scleral icterus.   Neck: Normal range of motion. Neck supple. No JVD present. No " thyromegaly present.   Cardiovascular: Normal rate, regular rhythm, normal heart sounds and intact distal pulses.  Exam reveals no gallop and no friction rub.    No murmur heard.  Pulses:       Carotid pulses are 2+ on the right side, and 2+ on the left side.       Radial pulses are 2+ on the right side, and 2+ on the left side.        Femoral pulses are 2+ on the right side, and 2+ on the left side.       Popliteal pulses are 2+ on the right side, and 2+ on the left side.        Dorsalis pedis pulses are 2+ on the right side, and 2+ on the left side.        Posterior tibial pulses are 2+ on the right side, and 2+ on the left side.   Pulmonary/Chest: Effort normal and breath sounds normal. No respiratory distress. He has no wheezes. He has no rales.   Abdominal: Soft. Bowel sounds are normal. He exhibits no distension. There is no hepatosplenomegaly. There is no tenderness. There is no rebound.   Musculoskeletal: Normal range of motion. He exhibits no edema or tenderness.   Neurological: He is alert and oriented to person, place, and time.   Skin: Skin is warm and dry. No rash noted. No erythema.   Psychiatric: He has a normal mood and affect. His behavior is normal. Judgment and thought content normal.   Vitals reviewed.    Lab Review:     ECG 12 Lead  Date/Time: 2/23/2017 7:40 PM  Performed by: PACO GOMEZ  Authorized by: PACO GOMEZ   Comparison: compared with previous ECG   Similar to previous ECG  Rhythm: sinus rhythm  Conduction comments: Nonspecific ST T wave changes  Clinical impression: abnormal ECG          Assessment:       Diagnosis Plan   1. Essential hypertension     2. Coronary artery disease involving native coronary artery of native heart without angina pectoris     3. Precordial pain  Adult Stress Echo With Color & Doppler   4. Dyspnea on exertion  XR Chest 2 View    Adult Stress Echo With Color & Doppler   5. Palpitations  Adult Stress Echo With Color & Doppler     Plan:       1   Episodes of  palpitations with dyspnea and chest tightness.  As it is reminiscent of his anginal pain we'll check a chest x-ray and a stress echocardiogram to assess for ischemia.  Depending on these results we will check a 30 day event monitor.  In addition of asked him to check his blood pressure with these episodes  2.  Coronary artery disease, non-ST elevation myocardial infarction, status post drug-coated stent placement. As above.  3.  Hypertension under good control  4.  Mitral regurgitation, recheck an echo as above  5.  Knee pain.  He is to see his orthopedist courses of asked him to avoid large amounts of nonsteroidals as if he develops and also this will complicate his cardiac care  6.   Hyperlipidemia being managed by Dr. Ruiz    Coronary Artery Disease  Assessment  • The patient has no angina    Plan  • Lifestyle modifications discussed include adhering to a heart healthy diet, maintenance of a healthy weight, regular exercise and regular monitoring of cholesterol and blood pressure    Subjective - Objective  • There is a history of past MI  • There has been a previous stent procedure using WENDIE  • Current antiplatelet therapy includes aspirin 81 mg         Matthew Morris   Home Medication Instructions AARON:    Printed on:02/26/17 1939   Medication Information                      amLODIPine (NORVASC) 2.5 MG tablet  TAKE 1 TABLET EVERY 12 HOURS             aspirin 81 MG EC tablet  Take 81 mg by mouth daily.             atorvastatin (LIPITOR) 80 MG tablet  Take 1 tablet by mouth daily.             EFFIENT 10 MG tablet  TAKE 1 TABLET DAILY             gabapentin (NEURONTIN) 600 MG tablet  Take 600 mg by mouth 3 (three) times a day             lisinopril-hydrochlorothiazide (PRINZIDE,ZESTORETIC) 20-25 MG per tablet  TAKE 1 TABLET DAILY             metoprolol tartrate (LOPRESSOR) 50 MG tablet  TAKE 1 TABLET TWICE A DAY               [facility] Dragon/Transcription disclaimer:   Much of this encounter note is an electronic  transcription/translation of spoken language to printed text. The electronic translation of spoken language may permit erroneous, or at times, nonsensical words or phrases to be inadvertently transcribed; Although I have reviewed the note for such errors, some may still exist.

## 2024-01-25 ENCOUNTER — TELEMEDICINE (OUTPATIENT)
Dept: FAMILY MEDICINE CLINIC | Facility: TELEHEALTH | Age: 54
End: 2024-01-25
Payer: OTHER GOVERNMENT

## 2024-01-25 DIAGNOSIS — R19.7 DIARRHEA, UNSPECIFIED TYPE: Primary | ICD-10-CM

## 2024-01-25 RX ORDER — DICYCLOMINE HYDROCHLORIDE 10 MG/1
10 CAPSULE ORAL 3 TIMES DAILY PRN
Qty: 9 CAPSULE | Refills: 0 | Status: SHIPPED | OUTPATIENT
Start: 2024-01-25

## 2024-01-25 RX ORDER — LOPERAMIDE HYDROCHLORIDE 2 MG/1
2 TABLET ORAL 4 TIMES DAILY PRN
Qty: 12 TABLET | Refills: 0 | Status: SHIPPED | OUTPATIENT
Start: 2024-01-25

## 2024-01-25 NOTE — PROGRESS NOTES
Subjective   Chief Complaint   Patient presents with    Diarrhea              Matthew Morris is a 53 y.o. male.     History of Present Illness  Patient reports watery diarrhea for the past 3 days.  He is having about 5 bowel movements a day.  He had an episode of incontinence while sleeping last night.  Denies fever, cramping, nausea and vomiting.  No known sick contacts.  No recent travel or new medication/antibiotic use.  GI Problem  The primary symptoms include diarrhea. Primary symptoms do not include fever, weight loss, fatigue, abdominal pain, nausea, vomiting, melena, hematemesis, jaundice, hematochezia, dysuria, myalgias, arthralgias or rash. Episode onset: 3 days. The problem has not changed since onset.  The diarrhea began 3 to 5 days ago. The diarrhea is watery. The diarrhea occurs 5 to 10 times per day (5 times/day).   The illness does not include chills, anorexia, dysphagia, odynophagia, bloating, constipation, tenesmus, back pain or itching.        No Known Allergies    Past Medical History:   Diagnosis Date    Angina pectoris     Aortic calcification 2008    Arthritis     CAD (coronary artery disease)     NSTEMI 4/2016: cath with 99% RCA s/p 3.5x12mm WENDIE, 30% ostial LCx, normal LM/LAD    Cervical radiculopathy     Chest pain     Coronary artery disease involving native coronary artery of native heart without angina pectoris     HERNÁNDEZ (dyspnea on exertion)     Essential hypertension     Headache     Hyperlipidemia     Hypertension     Joint pain     Joint swelling     Knee pain     Leg swelling     Malaise and fatigue     Moderate mitral regurgitation     by echo 4/2016    Neck pain     Palpitations     Pinched nerve in neck 2016    Precordial pain     SOB (shortness of breath)        Past Surgical History:   Procedure Laterality Date    CARDIAC CATHETERIZATION Left 4/28/2016    Procedure: Cardiac catheterization;  Surgeon: Linda Slaughter MD;  Location: [facility] CATH INVASIVE LOCATION;  Service:      CARDIAC CATHETERIZATION N/A 4/28/2016    Procedure: Left ventriculography;  Surgeon: Linda Slaughter MD;  Location: [facility] CATH INVASIVE LOCATION;  Service:     CORONARY ANGIOPLASTY WITH STENT PLACEMENT      CYST REMOVAL  1997    right testicle, benign    HERNIA REPAIR      HYDROCELE EXCISION / REPAIR  1997       Social History     Socioeconomic History    Marital status:    Tobacco Use    Smoking status: Never    Smokeless tobacco: Never   Vaping Use    Vaping Use: Never used   Substance and Sexual Activity    Alcohol use: No     Alcohol/week: 0.0 standard drinks of alcohol     Comment: Daily caffeine use    Drug use: Never    Sexual activity: Defer       Family History   Problem Relation Age of Onset    Heart disease Mother     Heart disease Father          Current Outpatient Medications:     amLODIPine (NORVASC) 2.5 MG tablet, Take 1 tablet by mouth Daily., Disp: 90 tablet, Rfl: 3    aspirin 81 MG EC tablet, Take 1 tablet by mouth Daily., Disp: , Rfl:     atorvastatin (LIPITOR) 80 MG tablet, Take 1 tablet by mouth daily., Disp: 90 tablet, Rfl: 3    benzonatate (TESSALON) 200 MG capsule, Take 1 capsule by mouth 3 (Three) Times a Day As Needed for Cough., Disp: 18 capsule, Rfl: 0    clopidogrel (PLAVIX) 75 MG tablet, Take 1 tablet by mouth Daily., Disp: 90 tablet, Rfl: 1    cyclobenzaprine (FLEXERIL) 10 MG tablet, Take 1 tablet by mouth As Needed., Disp: , Rfl:     dicyclomine (BENTYL) 10 MG capsule, Take 1 capsule by mouth 3 (Three) Times a Day As Needed for Abdominal Cramping., Disp: 9 capsule, Rfl: 0    fluticasone (Flonase) 50 MCG/ACT nasal spray, 2 sprays into the nostril(s) as directed by provider Daily., Disp: 16 mL, Rfl: 0    lisinopril-hydrochlorothiazide (PRINZIDE,ZESTORETIC) 20-12.5 MG per tablet, Take 2 tablets by mouth Daily., Disp: 180 tablet, Rfl: 3    loperamide (Imodium A-D) 2 MG tablet, Take 1 tablet by mouth 4 (Four) Times a Day As Needed for Diarrhea., Disp: 12 tablet, Rfl: 0     MEGARED OMEGA-3 KRILL  MG capsule, Take  by mouth Daily., Disp: , Rfl:     Metoprolol Tartrate 75 MG tablet, Take 75 mg by mouth 2 (Two) Times a Day., Disp: 180 tablet, Rfl: 3      Review of Systems   Constitutional:  Positive for activity change and appetite change. Negative for chills, diaphoresis, fatigue, fever and unexpected weight loss.   Gastrointestinal:  Positive for diarrhea. Negative for abdominal distention, abdominal pain, anal bleeding, anorexia, bloating, blood in stool, constipation, dysphagia, hematemesis, hematochezia, jaundice, melena, nausea, rectal pain, vomiting, GERD and indigestion.   Genitourinary:  Negative for dysuria.   Musculoskeletal:  Negative for arthralgias, back pain and myalgias.   Skin:  Negative for itching and rash.        There were no vitals filed for this visit.    Objective   Physical Exam     Procedures     Assessment & Plan   Diagnoses and all orders for this visit:    1. Diarrhea, unspecified type (Primary)  -     loperamide (Imodium A-D) 2 MG tablet; Take 1 tablet by mouth 4 (Four) Times a Day As Needed for Diarrhea.  Dispense: 12 tablet; Refill: 0  -     dicyclomine (BENTYL) 10 MG capsule; Take 1 capsule by mouth 3 (Three) Times a Day As Needed for Abdominal Cramping.  Dispense: 9 capsule; Refill: 0      Continue to increase fluids and eat a bland diet until your appetite returns.    If symptoms worsen or do not improve follow up with your PCP or visit your nearest Urgent Care Center or ER.      PLAN: Discussed dosing, side effects, recommended other symptomatic care.  Patient should follow up with primary care provider, Urgent Care or ER if symptoms worsen, fail to resolve or other symptoms need attention. Patient/family agree to the above.         CELI Whitmore     The use of a video visit has been reviewed with the patient and verbal informed consent has been obtained. Myself and Matthew Morris participated in this visit. The patient is located  at [address] [place] [id number]. I am located in [place], [place]. Mychart and Zoom were utilized.        This visit was performed via Telehealth.  This patient has been instructed to follow-up with their primary care provider if their symptoms worsen or the treatment provided does not resolve their illness.

## 2024-01-25 NOTE — PATIENT INSTRUCTIONS
Continue to increase fluids and eat a bland diet until your appetite returns.    If symptoms worsen or do not improve follow up with your PCP or visit your nearest Urgent Care Center or ER.

## 2024-02-17 ENCOUNTER — TELEMEDICINE (OUTPATIENT)
Dept: FAMILY MEDICINE CLINIC | Facility: TELEHEALTH | Age: 54
End: 2024-02-17
Payer: OTHER GOVERNMENT

## 2024-02-17 DIAGNOSIS — J22 LOWER RESPIRATORY INFECTION (E.G., BRONCHITIS, PNEUMONIA, PNEUMONITIS, PULMONITIS): Primary | ICD-10-CM

## 2024-02-17 RX ORDER — DEXTROMETHORPHAN HYDROBROMIDE AND PROMETHAZINE HYDROCHLORIDE 15; 6.25 MG/5ML; MG/5ML
5 SYRUP ORAL NIGHTLY PRN
Qty: 150 ML | Refills: 0 | Status: SHIPPED | OUTPATIENT
Start: 2024-02-17

## 2024-02-17 RX ORDER — PREDNISONE 10 MG/1
TABLET ORAL
Qty: 21 TABLET | Refills: 0 | Status: SHIPPED | OUTPATIENT
Start: 2024-02-17

## 2024-02-17 RX ORDER — AZITHROMYCIN 250 MG/1
TABLET, FILM COATED ORAL
Qty: 6 TABLET | Refills: 0 | Status: SHIPPED | OUTPATIENT
Start: 2024-02-17

## 2024-02-17 RX ORDER — BENZONATATE 200 MG/1
200 CAPSULE ORAL 3 TIMES DAILY PRN
Qty: 21 CAPSULE | Refills: 0 | Status: SHIPPED | OUTPATIENT
Start: 2024-02-17

## 2024-02-19 ENCOUNTER — TELEMEDICINE (OUTPATIENT)
Dept: FAMILY MEDICINE CLINIC | Facility: TELEHEALTH | Age: 54
End: 2024-02-19
Payer: OTHER GOVERNMENT

## 2024-02-19 VITALS — TEMPERATURE: 98.8 F

## 2024-02-19 DIAGNOSIS — J22 ACUTE RESPIRATORY INFECTION: Primary | ICD-10-CM

## 2024-02-19 NOTE — PATIENT INSTRUCTIONS
Continue treating symptoms.   Alternate tylenol and motrin for pain and/or fever, stay hydrated and rest.     If symptoms worsen or do not improve follow up with your PCP or visit your nearest Urgent Care Center or ER.

## 2024-02-19 NOTE — PROGRESS NOTES
Subjective   Chief Complaint   Patient presents with    URI     Wants covid testing         Matthew Morris is a 53 y.o. male.     History of Present Illness  Urgent care [place] visit.  Patient presents with cough, headache, respiratory tightness and chest congestion that started about 2 days ago.  He was seen virtually for the symptoms and prescribed cough syrup, prednisone and azithromycin.  He has been taking the medications with no significant relief and would like testing for COVID today.  He has a history of COVID but it has been over 1 year.  URI   This is a new problem. Episode onset: 2 days. The problem has been unchanged. There has been no fever. Associated symptoms include congestion, coughing and headaches. Pertinent negatives include no abdominal pain, chest pain, diarrhea, dysuria, ear pain, nausea, sore throat, vomiting or wheezing. Treatments tried: promethazine DM, tessalon, prednisone, azithromycin.        No Known Allergies    Past Medical History:   Diagnosis Date    Angina pectoris     Aortic calcification 2008    Arthritis     CAD (coronary artery disease)     NSTEMI 4/2016: cath with 99% RCA s/p 3.5x12mm WENDIE, 30% ostial LCx, normal LM/LAD    Cervical radiculopathy     Chest pain     Coronary artery disease involving native coronary artery of native heart without angina pectoris     HERNÁNDEZ (dyspnea on exertion)     Essential hypertension     Headache     Hyperlipidemia     Hypertension     Joint pain     Joint swelling     Knee pain     Leg swelling     Malaise and fatigue     Moderate mitral regurgitation     by echo 4/2016    Neck pain     Palpitations     Pinched nerve in neck 2016    Precordial pain     SOB (shortness of breath)        Past Surgical History:   Procedure Laterality Date    CARDIAC CATHETERIZATION Left 4/28/2016    Procedure: Cardiac catheterization;  Surgeon: Linda Slaughter MD;  Location: [facility] INVASIVE LOCATION;  Service:     CARDIAC CATHETERIZATION N/A 4/28/2016     Procedure: Left ventriculography;  Surgeon: Linda Slaughter MD;  Location: [facility] INVASIVE LOCATION;  Service:     CORONARY ANGIOPLASTY WITH STENT PLACEMENT      CYST REMOVAL  1997    right testicle, benign    HERNIA REPAIR      HYDROCELE EXCISION / REPAIR  1997       Social History     Socioeconomic History    Marital status:    Tobacco Use    Smoking status: Never    Smokeless tobacco: Never   Vaping Use    Vaping Use: Never used   Substance and Sexual Activity    Alcohol use: No     Alcohol/week: 0.0 standard drinks of alcohol     Comment: Daily caffeine use    Drug use: Never    Sexual activity: Defer       Family History   Problem Relation Age of Onset    Heart disease Mother     Heart disease Father          Current Outpatient Medications:     amLODIPine (NORVASC) 2.5 MG tablet, Take 1 tablet by mouth Daily., Disp: 90 tablet, Rfl: 3    aspirin 81 MG EC tablet, Take 1 tablet by mouth Daily., Disp: , Rfl:     atorvastatin (LIPITOR) 80 MG tablet, Take 1 tablet by mouth daily., Disp: 90 tablet, Rfl: 3    azithromycin (Zithromax Z-Cholo) 250 MG tablet, Take 2 tablets by mouth on day 1, then 1 tablet daily on days 2-5, Disp: 6 tablet, Rfl: 0    benzonatate (TESSALON) 200 MG capsule, Take 1 capsule by mouth 3 (Three) Times a Day As Needed for Cough., Disp: 21 capsule, Rfl: 0    clopidogrel (PLAVIX) 75 MG tablet, Take 1 tablet by mouth Daily., Disp: 90 tablet, Rfl: 1    cyclobenzaprine (FLEXERIL) 10 MG tablet, Take 1 tablet by mouth As Needed., Disp: , Rfl:     dicyclomine (BENTYL) 10 MG capsule, Take 1 capsule by mouth 3 (Three) Times a Day As Needed for Abdominal Cramping., Disp: 9 capsule, Rfl: 0    fluticasone (Flonase) 50 MCG/ACT nasal spray, 2 sprays into the nostril(s) as directed by provider Daily., Disp: 16 mL, Rfl: 0    lisinopril-hydrochlorothiazide (PRINZIDE,ZESTORETIC) 20-12.5 MG per tablet, Take 2 tablets by mouth Daily., Disp: 180 tablet, Rfl: 3    loperamide (Imodium A-D) 2 MG tablet, Take  1 tablet by mouth 4 (Four) Times a Day As Needed for Diarrhea., Disp: 12 tablet, Rfl: 0    MEGARED OMEGA-3 KRILL  MG capsule, Take  by mouth Daily., Disp: , Rfl:     Metoprolol Tartrate 75 MG tablet, Take 75 mg by mouth 2 (Two) Times a Day., Disp: 180 tablet, Rfl: 3    predniSONE (DELTASONE) 10 MG (21) dose pack, Use as directed on package, Disp: 21 tablet, Rfl: 0    promethazine-dextromethorphan (PROMETHAZINE-DM) 6.25-15 MG/5ML syrup, Take 5 mL by mouth At Night As Needed for Cough., Disp: 150 mL, Rfl: 0      Review of Systems   Constitutional:  Positive for fatigue.   HENT:  Positive for congestion. Negative for ear pain and sore throat.    Respiratory:  Positive for cough and chest tightness. Negative for shortness of breath and wheezing.    Cardiovascular:  Negative for chest pain.   Gastrointestinal:  Negative for abdominal pain, diarrhea, nausea and vomiting.   Genitourinary:  Negative for dysuria.   Musculoskeletal:  Positive for myalgias.   Neurological:  Positive for headache.        Vitals:    02/19/24 0838   Temp: 98.8 °F (37.1 °C)       Objective   Physical Exam  Constitutional:       General: He is not in acute distress.     Appearance: Normal appearance. He is not ill-appearing, toxic-appearing or diaphoretic.   HENT:      Head: Normocephalic.      Nose: Congestion present.      Mouth/Throat:      Lips: Pink.      Mouth: Mucous membranes are moist.   Cardiovascular:      Rate and Rhythm: Normal rate and regular rhythm.   Pulmonary:      Effort: Pulmonary effort is normal.      Breath sounds: Normal breath sounds.   Neurological:      Mental Status: He is alert and oriented to person, place, and time.          Procedures     Assessment & Plan   Diagnoses and all orders for this visit:    1. Acute respiratory infection (Primary)  -     KULDEEP FLU + SARS PCR; Future    Continue treating symptoms.   Alternate tylenol and motrin for pain and/or fever, stay hydrated and rest.     If symptoms worsen or  do not improve follow up with your PCP or visit your nearest Urgent Care Center or ER.          PLAN: Discussed dosing, side effects, recommended other symptomatic care.  Patient should follow up with primary care provider, Urgent Care or ER if symptoms worsen, fail to resolve or other symptoms need attention. Patient/family agree to the above.         CELI Whitmore     The use of a video visit has been reviewed with the patient and verbal informed consent has been obtained. Myself and Matthew Morris participated in this visit. The patient is located at [address] [place] [address]. I am located in [place], [place]. Mychart and Zoom were utilized.        This visit was performed via Telehealth.  This patient has been instructed to follow-up with their primary care provider if their symptoms worsen or the treatment provided does not resolve their illness.

## 2024-03-29 ENCOUNTER — TELEPHONE (OUTPATIENT)
Dept: CARDIOLOGY | Facility: CLINIC | Age: 54
End: 2024-03-29

## 2024-03-29 ENCOUNTER — OFFICE VISIT (OUTPATIENT)
Dept: CARDIOLOGY | Facility: CLINIC | Age: 54
End: 2024-03-29
Payer: OTHER GOVERNMENT

## 2024-03-29 VITALS
SYSTOLIC BLOOD PRESSURE: 146 MMHG | DIASTOLIC BLOOD PRESSURE: 92 MMHG | HEIGHT: 66 IN | WEIGHT: 239 LBS | HEART RATE: 58 BPM | BODY MASS INDEX: 38.41 KG/M2

## 2024-03-29 DIAGNOSIS — I25.10 CORONARY ARTERY DISEASE INVOLVING NATIVE CORONARY ARTERY OF NATIVE HEART WITHOUT ANGINA PECTORIS: ICD-10-CM

## 2024-03-29 DIAGNOSIS — Z13.6 ENCOUNTER FOR SCREENING FOR VASCULAR DISEASE: Primary | ICD-10-CM

## 2024-03-29 DIAGNOSIS — I10 PRIMARY HYPERTENSION: ICD-10-CM

## 2024-03-29 DIAGNOSIS — G47.33 OSA (OBSTRUCTIVE SLEEP APNEA): ICD-10-CM

## 2024-03-29 DIAGNOSIS — E78.2 MIXED HYPERLIPIDEMIA: ICD-10-CM

## 2024-03-29 RX ORDER — METOPROLOL TARTRATE 100 MG/1
100 TABLET ORAL 2 TIMES DAILY
Qty: 180 TABLET | Refills: 3 | Status: SHIPPED | OUTPATIENT
Start: 2024-03-29

## 2024-03-29 NOTE — TELEPHONE ENCOUNTER
----- Message from Matthew Morris sent at 3/29/2024  9:40 AM EDT -----  Regarding: Latest lab work   Contact: [phone]  Not a question,  just wanted you to have information    Patient Entered Attachment - [id number] (03/29/2024)     Copy of his labs.  Just click the link and it will take you to the PDF.

## 2024-03-29 NOTE — TELEPHONE ENCOUNTER
Labs look good overall.  There was some blood in his urine with a negative urine culture.  Would make sure that someone is following up on this (either PCP or urology).

## 2024-03-29 NOTE — PROGRESS NOTES
Date of Office Visit: 2024  Encounter Provider: Christine Carson MD  Place of Service: [facility] CARDIOLOGY  Patient Name: Matthew Morris  :1970    Chief complaint  Transient cardiomyopathy, coronary artery disease, hypertension    History of Present Illness  Patient is a very pleasant 53-year-old gentleman who was transferred from [facility] on 2016 after chest pain with an elevated troponin consistent with non-ST elevation myocardial infarction.  Echocardiogram revealed an ejection fraction 40% and a catheterization revealed severe disease of the right coronary artery.  He received a drug-coated stent.  He has had intermittent chest pain since then in the setting of hypertension.  He was admitted on  for such an occurrence he ruled out for myocardial infarction.  An echocardiogram revealed his ejection fraction had improved to 50% with mild to moderate mitral regurgitation. 2017 he complained of brief palpitations and chest pain that was atypical.  Ziopatch showed no arrhythmia.  On 2022 with complaints of shortness of breath echo showed an ejection fraction of 62%, normal diastolic function, trivial valve regurgitation, normal right-sided pressures.  A stress perfusion study was negative for ischemia though there was GI artifact and there was biventricular enlargement.  It was felt to be a low risk study.  The patient had attempted to exercise but did not achieve maximal heart rate therefore Lexiscan was administered.    Since last visit he is using his CPAP consistently.  He has had no chest pain or shortness of breath.  Has had mild dependent edema.  Has dizziness intermittently for the past week this is new.  Palpitations though these are chronic.  He is exercising by walking 2 to 3 miles almost daily.  He has had hematuria and has an appointment to address this in the near future.  His blood pressure at home has been elevated as  it is today.  Despite being on a low-salt diet and treating sleep apnea.  He is also exercising.  He is also noted to have hematuria intermittently 2 months ago on urinalysis and now more grossly.  He is to see urology next week.  He remains on dual antiplatelet therapy as well as omega-3 supplements.  He has had 2 episodes of palpitations lasting a few seconds.  He has had mild dependent edema though none evident this morning.  He has had dizziness but is most consistent with vertigo.  There is no focal motor or sensory deficits.  Recent labs also demonstrated slight elevation in liver function test which is to be rechecked in 2 weeks.      Past Medical History:   Diagnosis Date    Angina pectoris     Aortic calcification 2008    Arthritis     CAD (coronary artery disease)     NSTEMI 4/2016: cath with 99% RCA s/p 3.5x12mm WENDIE, 30% ostial LCx, normal LM/LAD    Cervical radiculopathy     Chest pain     Coronary artery disease involving native coronary artery of native heart without angina pectoris     HERNÁNDEZ (dyspnea on exertion)     Essential hypertension     Headache     Hyperlipidemia     Hypertension     Joint pain     Joint swelling     Knee pain     Leg swelling     Malaise and fatigue     Moderate mitral regurgitation     by echo 4/2016    Neck pain     Palpitations     Pinched nerve in neck 2016    Precordial pain     SOB (shortness of breath)      Past Surgical History:   Procedure Laterality Date    CARDIAC CATHETERIZATION Left 4/28/2016    Procedure: Cardiac catheterization;  Surgeon: Linda Slaughter MD;  Location: [facility] CATH INVASIVE LOCATION;  Service:     CARDIAC CATHETERIZATION N/A 4/28/2016    Procedure: Left ventriculography;  Surgeon: Linda Slaughter MD;  Location:  [facility] CATH INVASIVE LOCATION;  Service:     CORONARY ANGIOPLASTY WITH STENT PLACEMENT      CYST REMOVAL  1997    right testicle, benign    HERNIA REPAIR      HYDROCELE EXCISION / REPAIR  1997     Outpatient Medications Prior to Visit    Medication Sig Dispense Refill    amLODIPine (NORVASC) 2.5 MG tablet Take 1 tablet by mouth Daily. 90 tablet 3    atorvastatin (LIPITOR) 80 MG tablet Take 1 tablet by mouth daily. 90 tablet 3    clopidogrel (PLAVIX) 75 MG tablet Take 1 tablet by mouth Daily. 90 tablet 1    cyclobenzaprine (FLEXERIL) 10 MG tablet Take 1 tablet by mouth As Needed.      fluticasone (Flonase) 50 MCG/ACT nasal spray 2 sprays into the nostril(s) as directed by provider Daily. 16 mL 0    lisinopril-hydrochlorothiazide (PRINZIDE,ZESTORETIC) 20-12.5 MG per tablet Take 2 tablets by mouth Daily. 180 tablet 3    aspirin 81 MG EC tablet Take 1 tablet by mouth Daily.      dicyclomine (BENTYL) 10 MG capsule Take 1 capsule by mouth 3 (Three) Times a Day As Needed for Abdominal Cramping. 9 capsule 0    loperamide (Imodium A-D) 2 MG tablet Take 1 tablet by mouth 4 (Four) Times a Day As Needed for Diarrhea. 12 tablet 0    MEGARED OMEGA-3 KRILL  MG capsule Take  by mouth Daily.      Metoprolol Tartrate 75 MG tablet Take 75 mg by mouth 2 (Two) Times a Day. 180 tablet 3    azithromycin (Zithromax Z-Hcolo) 250 MG tablet Take 2 tablets by mouth on day 1, then 1 tablet daily on days 2-5 (Patient not taking: Reported on 3/29/2024) 6 tablet 0    benzonatate (TESSALON) 200 MG capsule Take 1 capsule by mouth 3 (Three) Times a Day As Needed for Cough. (Patient not taking: Reported on 3/29/2024) 21 capsule 0    predniSONE (DELTASONE) 10 MG (21) dose pack Use as directed on package (Patient not taking: Reported on 3/29/2024) 21 tablet 0    promethazine-dextromethorphan (PROMETHAZINE-DM) 6.25-15 MG/5ML syrup Take 5 mL by mouth At Night As Needed for Cough. (Patient not taking: Reported on 3/29/2024) 150 mL 0     No facility-administered medications prior to visit.       Allergies as of 03/29/2024    (No Known Allergies)     Social History     Socioeconomic History    Marital status:    Tobacco Use    Smoking status: Never    Smokeless tobacco: Never  "  Vaping Use    Vaping status: Never Used   Substance and Sexual Activity    Alcohol use: No     Alcohol/week: 0.0 standard drinks of alcohol     Comment: Daily caffeine use    Drug use: Never    Sexual activity: Defer     Family History   Problem Relation Age of Onset    Heart disease Mother     Heart disease Father      Review of Systems   Constitutional: Negative for chills, fever, weight gain and weight loss.   Cardiovascular:  Negative for leg swelling.   Respiratory:  Negative for cough, snoring and wheezing.    Hematologic/Lymphatic: Negative for bleeding problem. Does not bruise/bleed easily.   Skin:  Negative for color change.   Musculoskeletal:  Positive for joint pain. Negative for falls and myalgias.   Gastrointestinal:  Negative for hematemesis and melena.   Genitourinary:  Positive for hematuria.   Neurological:  Positive for dizziness. Negative for excessive daytime sleepiness.   Psychiatric/Behavioral:  Negative for depression. The patient is not nervous/anxious.         Objective:     Vitals:    03/29/24 0906   BP: 146/92   Pulse: 58   Weight: 108 kg (239 lb)   Height: 167.6 cm (66\")     Body mass index is 38.58 kg/m².    Vitals reviewed.   Constitutional:       Appearance: Well-developed. Obese.   Eyes:      General: No scleral icterus.        Right eye: No discharge.      Conjunctiva/sclera: Conjunctivae normal.      Pupils: Pupils are equal, round, and reactive to light.   HENT:      Head: Normocephalic.      Nose: Nose normal.   Neck:      Thyroid: No thyromegaly.      Vascular: No JVD.   Pulmonary:      Effort: Pulmonary effort is normal. No respiratory distress.      Breath sounds: Normal breath sounds. No wheezing. No rales.   Cardiovascular:      Normal rate. Regular rhythm. Normal S1. Normal S2.       Murmurs: There is no murmur.      No gallop.    Pulses:     Intact distal pulses.      Carotid: 2+ bilaterally.     Radial: 2+ bilaterally.     Femoral: 2+ bilaterally.     Popliteal: 2+ " bilaterally.     Dorsalis pedis: 2+ bilaterally.     Posterior tibial: 2+ bilaterally.  Edema:     Peripheral edema absent.   Abdominal:      General: Bowel sounds are normal. There is no distension.      Palpations: Abdomen is soft.      Tenderness: There is no abdominal tenderness. There is no rebound.   Musculoskeletal: Normal range of motion.         General: No tenderness.      Cervical back: Normal range of motion and neck supple. Skin:     General: Skin is warm and dry.      Findings: No erythema or rash.   Neurological:      Mental Status: Alert and oriented to person, place, and time.   Psychiatric:         Behavior: Behavior normal.         Thought Content: Thought content normal.         Judgment: Judgment normal.       Lab Review:             ECG 12 Lead    Date/Time: 3/29/2024 9:24 AM  Performed by: Christine Carson MD    Authorized by: Christine Carson MD  Previous ECG: no previous ECG available  Rhythm: sinus rhythm  Other findings comments: Mild chronic ST elevation.    Clinical impression: abnormal EKG            Diagnosis Plan   1. Encounter for screening for vascular disease  Vascular Screening (Bundle) CAR      2. Coronary artery disease involving native coronary artery of native heart without angina pectoris  ECG 12 Lead      3. Primary hypertension        4. Mixed hyperlipidemia        5. EVON (obstructive sleep apnea)          Plan:       1   Palpitations.  Very infrequent at this time.  Continue with the current regimen.  2.  Chest pain.  Resolved  3.  Edema, not evident at this moment and mild.  Will need to watch with upcoming summer months.  4.  Coronary artery disease, non-ST elevation myocardial infarction, status post WENDIE to RCA in 2016.  Negative stress test and excellent workload in 9/2022.  No anginal symptoms.  With hematuria recommended he discontinue aspirin.  He is to have more invasive mastication and urology needs to continue Plavix would prefer he stay on low-dose aspirin but if they  require him off both antiplatelet therapy will try to limit time off both to less than a week.  5.  Hypertension.  Remains elevated.  Will increase Lopressor to 100 mg twice daily.  May need to titrate further.  Ideally would like to discontinue amlodipine as blood pressure allows.  Will have him call next week with additional readings.  6.  Hyperlipidemia, controlled  7.  Obstructive sleep apnea.  On CPAP therapy.  8.  Hematuria.  Being seen by urology next week.  I asked him to discontinue omega-3 supplements and aspirin.      Time Spent: I spent 35 minutes caring for Matthew on this date of service. This time includes time spent by me in the following activities: preparing for the visit, reviewing tests, obtaining and/or reviewing a separately obtained history, performing a medically appropriate examination and/or evaluation, counseling and educating the patient/family/caregiver, ordering medications, tests, or procedures, documenting information in the medical record, and independently interpreting results and communicating that information with the patient/family/caregiver.   I spent 1 minutes on the separately reported service of ECG. This time is not included in the time used to support the E/M service also reported today.        Your medication list            Accurate as of March 29, 2024 10:27 PM. If you have any questions, ask your nurse or doctor.                START taking these medications        Instructions Last Dose Given Next Dose Due   metoprolol tartrate 100 MG tablet  Commonly known as: LOPRESSOR  Started by: Christine Carson MD      Take 1 tablet by mouth 2 (Two) Times a Day.              CONTINUE taking these medications        Instructions Last Dose Given Next Dose Due   amLODIPine 2.5 MG tablet  Commonly known as: NORVASC      Take 1 tablet by mouth Daily.       atorvastatin 80 MG tablet  Commonly known as: LIPITOR      Take 1 tablet by mouth daily.       clopidogrel 75 MG tablet  Commonly known  as: PLAVIX      Take 1 tablet by mouth Daily.       cyclobenzaprine 10 MG tablet  Commonly known as: FLEXERIL      Take 1 tablet by mouth As Needed.       fluticasone 50 MCG/ACT nasal spray  Commonly known as: Flonase      2 sprays into the nostril(s) as directed by provider Daily.       lisinopril-hydrochlorothiazide 20-12.5 MG per tablet  Commonly known as: PRINZIDE,ZESTORETIC      Take 2 tablets by mouth Daily.                 Where to Get Your Medications        These medications were sent to [id number] - [place], [place] - [address] AT [facility] OF [address] [facility] & [place] - [phone] PH - [phone] FX  [address] [place] [address]      Phone: [phone]   metoprolol tartrate 100 MG tablet         Patient is no longer taking -.  I corrected the med list to reflect this.  I did not stop these medications.      Dictated utilizing Dragon dictation

## 2024-03-29 NOTE — TELEPHONE ENCOUNTER
I spoke with Matthew Morris and updated pt on results/recommendations from provider.  They verbalized understanding.    Pt has an appt on 4/5 with urology.    Thank you,    Kitty CHILDRESS, RN  Triage [facility] [place]  03/29/24 13:18 EDT

## 2024-03-30 NOTE — TELEPHONE ENCOUNTER
Please call patient next week to find out how is his blood pressure after metoprolol was increased today   none

## 2024-04-01 NOTE — TELEPHONE ENCOUNTER
Pt called back in.  Pt states that BP Saturday was 147/86 and this morning it was 142/76, but both were checked before taking medicines.  I recommended that pt start checking an hour or 2 after taking medications and continue to log.  I also recommended that he call back in if BP consistently >130/80.     Belkis Prabhakar, RN  Triage Nurse, [facility] [place]  04/01/24 09:38 EDT

## 2024-04-01 NOTE — TELEPHONE ENCOUNTER
Called and left VM. Will continue to try to reach patient. HUB transfer call to triage.     Lori Lacy RN  Triage [facility]

## 2024-04-04 DIAGNOSIS — I10 ESSENTIAL HYPERTENSION: ICD-10-CM

## 2024-04-04 RX ORDER — AMLODIPINE BESYLATE 2.5 MG/1
2.5 TABLET ORAL 2 TIMES DAILY
Qty: 180 TABLET | Refills: 3 | Status: SHIPPED | OUTPATIENT
Start: 2024-04-04 | End: 2024-04-08

## 2024-04-06 DIAGNOSIS — I10 ESSENTIAL HYPERTENSION: ICD-10-CM

## 2024-04-08 RX ORDER — LISINOPRIL AND HYDROCHLOROTHIAZIDE 20; 12.5 MG/1; MG/1
2 TABLET ORAL DAILY
Qty: 180 TABLET | Refills: 3 | Status: SHIPPED | OUTPATIENT
Start: 2024-04-08

## 2024-04-08 RX ORDER — AMLODIPINE BESYLATE 2.5 MG/1
2.5 TABLET ORAL DAILY
Qty: 90 TABLET | Refills: 3 | Status: SHIPPED | OUTPATIENT
Start: 2024-04-08

## 2024-04-12 ENCOUNTER — HOSPITAL ENCOUNTER (OUTPATIENT)
Dept: CARDIOLOGY | Facility: HOSPITAL | Age: 54
Discharge: HOME | End: 2024-04-12
Payer: OTHER GOVERNMENT

## 2024-04-12 ENCOUNTER — TELEPHONE (OUTPATIENT)
Dept: CARDIOLOGY | Facility: CLINIC | Age: 54
End: 2024-04-12
Payer: OTHER GOVERNMENT

## 2024-04-12 DIAGNOSIS — Z13.6 ENCOUNTER FOR SCREENING FOR VASCULAR DISEASE: Primary | ICD-10-CM

## 2024-04-12 DIAGNOSIS — Z13.6 ENCOUNTER FOR SCREENING FOR VASCULAR DISEASE: ICD-10-CM

## 2024-04-12 LAB
BH CV ECHO MEAS - DIST AO DIAM: 1.39 CM
BH CV VAS SCREENING CAROTID CCA LEFT: 66 CM/SEC
BH CV VAS SCREENING CAROTID CCA RIGHT: 89 CM/SEC
BH CV VAS SCREENING CAROTID ICA LEFT: 75 CM/SEC
BH CV VAS SCREENING CAROTID ICA RIGHT: 68 CM/SEC
BH CV XLRA MEAS - MID AO DIAM: 1.65 CM
BH CV XLRA MEAS - PAD LEFT ABI DP: 1.29
BH CV XLRA MEAS - PAD LEFT ABI PT: 1.21
BH CV XLRA MEAS - PAD LEFT ARM: 139 MMHG
BH CV XLRA MEAS - PAD LEFT LEG DP: 179 MMHG
BH CV XLRA MEAS - PAD LEFT LEG PT: 168 MMHG
BH CV XLRA MEAS - PAD RIGHT ABI DP: 1.26
BH CV XLRA MEAS - PAD RIGHT ABI PT: 1.11
BH CV XLRA MEAS - PAD RIGHT ARM: 134 MMHG
BH CV XLRA MEAS - PAD RIGHT LEG DP: 175 MMHG
BH CV XLRA MEAS - PAD RIGHT LEG PT: 154 MMHG
BH CV XLRA MEAS - PROX AO DIAM: 2.09 CM
BH CV XLRA MEAS LEFT DIST CCA EDV: -20.3 CM/SEC
BH CV XLRA MEAS LEFT DIST CCA PSV: -66.4 CM/SEC
BH CV XLRA MEAS LEFT ICA/CCA RATIO: 1.14
BH CV XLRA MEAS LEFT PROX ECA EDV: -12.4 CM/SEC
BH CV XLRA MEAS LEFT PROX ECA PSV: 73 CM/SEC
BH CV XLRA MEAS LEFT PROX ICA EDV: -23.6 CM/SEC
BH CV XLRA MEAS LEFT PROX ICA PSV: -75.2 CM/SEC
BH CV XLRA MEAS RIGHT DIST CCA EDV: -21.7 CM/SEC
BH CV XLRA MEAS RIGHT DIST CCA PSV: -88.8 CM/SEC
BH CV XLRA MEAS RIGHT ICA/CCA RATIO: 0.76
BH CV XLRA MEAS RIGHT PROX ECA PSV: 76 CM/SEC

## 2024-04-12 PROCEDURE — 93799 UNLISTED CV SVC/PROCEDURE: CPT

## 2024-04-12 NOTE — TELEPHONE ENCOUNTER
Please let him know that vascular screening study showed a normal left carotid artery, normal abdominal aorta, and no evidence of peripheral arterial disease.  There was a portion of the right carotid artery that was not seen well on the study.  The radiologist that read the study recommended a dedicated carotid Doppler to evaluate the right carotid artery.  Please let me know if he is agreeable to do this or if he would like to wait and discuss with PCP.  Otherwise continue current medications and keep currently scheduled follow-up appointments

## 2024-04-12 NOTE — TELEPHONE ENCOUNTER
Called and left VM, will continue to try to reach pt.    HUB- please put patient straight through to triage    Belkis Prabhakar, RN  Triage RN  04/12/24 10:08 EDT

## 2024-04-15 NOTE — TELEPHONE ENCOUNTER
Called and left VM, will continue to try to reach pt.    HUB- please put patient straight through to triage    Belkis Prabhakar, RN  Triage RN  04/15/24 12:08 EDT

## 2024-04-15 NOTE — TELEPHONE ENCOUNTER
Wife called back in to triage.  Results and recommendations reviewed with pt's wife.  Instructed to call with any further questions or concerns.  Verbalized understanding.    Elaina- wife states that pt is agreeable to carotid doppler    Belkis Prabhakar RN  Triage Nurse, [facility] [place]  04/15/24 12:18 EDT

## 2024-04-24 DIAGNOSIS — I10 ESSENTIAL HYPERTENSION: ICD-10-CM

## 2024-04-24 RX ORDER — AMLODIPINE BESYLATE 5 MG/1
5 TABLET ORAL DAILY
Qty: 90 TABLET | Refills: 3 | Status: SHIPPED | OUTPATIENT
Start: 2024-04-24

## 2024-05-09 ENCOUNTER — HOSPITAL ENCOUNTER (OUTPATIENT)
Dept: CARDIOLOGY | Facility: HOSPITAL | Age: 54
Discharge: HOME | End: 2024-05-09
Payer: OTHER GOVERNMENT

## 2024-05-09 DIAGNOSIS — Z13.6 ENCOUNTER FOR SCREENING FOR VASCULAR DISEASE: ICD-10-CM

## 2024-05-09 LAB
BH CV XLRA MEAS LEFT CAROTID BULB EDV: -9.3 CM/SEC
BH CV XLRA MEAS LEFT CAROTID BULB PSV: -24.9 CM/SEC
BH CV XLRA MEAS LEFT DIST CCA EDV: -21.7 CM/SEC
BH CV XLRA MEAS LEFT DIST CCA PSV: -90.1 CM/SEC
BH CV XLRA MEAS LEFT DIST ICA EDV: -18 CM/SEC
BH CV XLRA MEAS LEFT DIST ICA PSV: -61.5 CM/SEC
BH CV XLRA MEAS LEFT ICA/CCA RATIO: 0.77
BH CV XLRA MEAS LEFT MID ICA EDV: -23.4 CM/SEC
BH CV XLRA MEAS LEFT MID ICA PSV: -68.6 CM/SEC
BH CV XLRA MEAS LEFT PROX CCA EDV: -15.5 CM/SEC
BH CV XLRA MEAS LEFT PROX CCA PSV: -96.3 CM/SEC
BH CV XLRA MEAS LEFT PROX ECA EDV: -15.5 CM/SEC
BH CV XLRA MEAS LEFT PROX ECA PSV: -83.9 CM/SEC
BH CV XLRA MEAS LEFT PROX ICA EDV: -24.2 CM/SEC
BH CV XLRA MEAS LEFT PROX ICA PSV: -69 CM/SEC
BH CV XLRA MEAS LEFT VERTEBRAL A EDV: -8.7 CM/SEC
BH CV XLRA MEAS LEFT VERTEBRAL A PSV: -38.5 CM/SEC
BH CV XLRA MEAS RIGHT CAROTID BULB EDV: -11.8 CM/SEC
BH CV XLRA MEAS RIGHT CAROTID BULB PSV: -42.2 CM/SEC
BH CV XLRA MEAS RIGHT DIST CCA EDV: -19.6 CM/SEC
BH CV XLRA MEAS RIGHT DIST CCA PSV: -106.6 CM/SEC
BH CV XLRA MEAS RIGHT DIST ICA EDV: -23.6 CM/SEC
BH CV XLRA MEAS RIGHT DIST ICA PSV: -75.2 CM/SEC
BH CV XLRA MEAS RIGHT ICA/CCA RATIO: 0.49
BH CV XLRA MEAS RIGHT MID ICA EDV: -16.2 CM/SEC
BH CV XLRA MEAS RIGHT MID ICA PSV: -67.7 CM/SEC
BH CV XLRA MEAS RIGHT PROX CCA EDV: 18.8 CM/SEC
BH CV XLRA MEAS RIGHT PROX CCA PSV: 123.1 CM/SEC
BH CV XLRA MEAS RIGHT PROX ECA EDV: -16.5 CM/SEC
BH CV XLRA MEAS RIGHT PROX ECA PSV: -115.2 CM/SEC
BH CV XLRA MEAS RIGHT PROX ICA EDV: -16.8 CM/SEC
BH CV XLRA MEAS RIGHT PROX ICA PSV: -51.6 CM/SEC
BH CV XLRA MEAS RIGHT VERTEBRAL A EDV: 13.3 CM/SEC
BH CV XLRA MEAS RIGHT VERTEBRAL A PSV: 38.3 CM/SEC
LEFT ARM BP: NORMAL MMHG
RIGHT ARM BP: NORMAL MMHG

## 2024-05-09 PROCEDURE — 93880 EXTRACRANIAL BILAT STUDY: CPT

## 2024-05-10 ENCOUNTER — TELEPHONE (OUTPATIENT)
Dept: CARDIOLOGY | Facility: CLINIC | Age: 54
End: 2024-05-10
Payer: OTHER GOVERNMENT

## 2024-05-17 ENCOUNTER — TELEMEDICINE (OUTPATIENT)
Dept: FAMILY MEDICINE CLINIC | Facility: TELEHEALTH | Age: 54
End: 2024-05-17
Payer: OTHER GOVERNMENT

## 2024-05-17 VITALS — HEART RATE: 57 BPM | TEMPERATURE: 97.3 F

## 2024-05-17 DIAGNOSIS — H65.193 ACUTE EFFUSION OF BOTH MIDDLE EARS: ICD-10-CM

## 2024-05-17 DIAGNOSIS — H66.001 ACUTE SUPPURATIVE OTITIS MEDIA OF RIGHT EAR WITHOUT SPONTANEOUS RUPTURE OF TYMPANIC MEMBRANE, RECURRENCE NOT SPECIFIED: Primary | ICD-10-CM

## 2024-05-17 DIAGNOSIS — J01.40 ACUTE PANSINUSITIS, RECURRENCE NOT SPECIFIED: ICD-10-CM

## 2024-05-17 PROCEDURE — 99213 OFFICE O/P EST LOW 20 MIN: CPT | Performed by: NURSE PRACTITIONER

## 2024-05-17 RX ORDER — AMOXICILLIN AND CLAVULANATE POTASSIUM 875; 125 MG/1; MG/1
1 TABLET, FILM COATED ORAL 2 TIMES DAILY
Qty: 20 TABLET | Refills: 0 | Status: SHIPPED | OUTPATIENT
Start: 2024-05-17 | End: 2024-05-27

## 2024-05-17 RX ORDER — ONDANSETRON 4 MG/1
4 TABLET, ORALLY DISINTEGRATING ORAL EVERY 8 HOURS PRN
Qty: 10 TABLET | Refills: 0 | Status: SHIPPED | OUTPATIENT
Start: 2024-05-17

## 2024-05-17 RX ORDER — FLUTICASONE PROPIONATE 50 MCG
2 SPRAY, SUSPENSION (ML) NASAL DAILY
Qty: 16 G | Refills: 0 | Status: SHIPPED | OUTPATIENT
Start: 2024-05-17

## 2024-05-17 NOTE — PATIENT INSTRUCTIONS
Drink plenty of water  Over the counter pain relievers okay   If symptoms do not improve in 3-5 days follow up with your primary care provider or urgent care       Otitis Media, Adult  Otitis media occurs when there is inflammation and fluid in the middle ear with signs and symptoms of an acute infection. The middle ear is a part of the ear that contains bones for hearing as well as air that helps send sounds to the brain. When infected fluid builds up in this space, it causes pressure and can lead to an ear infection. The eustachian tube connects the middle ear to the back of the nose (nasopharynx) and normally allows air into the middle ear. If the eustachian tube becomes blocked, fluid can build up and become infected.  What are the causes?  This condition is caused by a blockage in the eustachian tube. This can be caused by mucus or by swelling of the tube. Problems that can cause a blockage include:  A cold or other upper respiratory infection.  Allergies.  An irritant, such as tobacco smoke.  Enlarged adenoids. The adenoids are areas of soft tissue located high in the back of the throat, behind the nose and the roof of the mouth. They are part of the body's defense system (immune system).  A mass in the nasopharynx.  Damage to the ear caused by pressure changes (barotrauma).  What increases the risk?  You are more likely to develop this condition if you:  Smoke or are exposed to tobacco smoke.  Have an opening in the roof of your mouth (cleft palate).  Have gastroesophageal reflux.  Have an immune system disorder.  What are the signs or symptoms?  Symptoms of this condition include:  Ear pain.  Fever.  Decreased hearing.  Tiredness (lethargy).  Fluid leaking from the ear, if the eardrum is ruptured or has burst.  Ringing in the ear.  How is this diagnosed?  This condition is diagnosed with a physical exam. During the exam, your health care provider will use an instrument called an otoscope to look in your ear  and check for redness, swelling, and fluid. He or she will also ask about your symptoms.  Your health care provider may also order tests, such as:  A pneumatic otoscopy. This is a test to check the movement of the eardrum. It is done by squeezing a small amount of air into the ear.  A tympanogram. This is a test that shows how well the eardrum moves in response to air pressure in the ear canal. It provides a graph for your health care provider to review.  How is this treated?  This condition can go away on its own within 3-5 days. But if the condition is caused by a bacterial infection and does not go away on its own, or if it keeps coming back, your health care provider may:  Prescribe antibiotic medicine to treat the infection.  Prescribe or recommend medicines to control pain.  Follow these instructions at home:  Take over-the-counter and prescription medicines only as told by your health care provider.  If you were prescribed an antibiotic medicine, take it as told by your health care provider. Do not stop taking the antibiotic even if you start to feel better.  Keep all follow-up visits. This is important.  Contact a health care provider if:  You have bleeding from your nose.  There is a lump on your neck.  You are not feeling better in 5 days.  You feel worse instead of better.  Get help right away if:  You have severe pain that is not controlled with medicine.  You have swelling, redness, or pain around your ear.  You have stiffness in your neck.  A part of your face is not moving (paralyzed).  The bone behind your ear (mastoid bone) is tender when you touch it.  You develop a severe headache.  Summary  Otitis media is redness, soreness, and swelling of the middle ear, usually resulting in pain and decreased hearing.  This condition can go away on its own within 3-5 days.  If the problem does not go away in 3-5 days, your health care provider may give you medicines to treat the infection.  If you were  prescribed an antibiotic medicine, take it as told by your health care provider.  Follow all instructions that were given to you by your health care provider.  This information is not intended to replace advice given to you by your health care provider. Make sure you discuss any questions you have with your health care provider.  Document Revised: 03/28/2022 Document Reviewed: 03/28/2022  [employer] Patient Education © 2024 [employer]          Sinus Infection, Adult  A sinus infection is soreness and swelling (inflammation) of your sinuses. Sinuses are hollow spaces in the bones around your face. They are located:  Around your eyes.  In the middle of your forehead.  Behind your nose.  In your cheekbones.  Your sinuses and nasal passages are lined with a fluid called mucus. Mucus drains out of your sinuses. Swelling can trap mucus in your sinuses. This lets germs (bacteria, virus, or fungus) grow, which leads to infection. Most of the time, this condition is caused by a virus.  What are the causes?  Allergies.  Asthma.  Germs.  Things that block your nose or sinuses.  Growths in the nose (nasal polyps).  Chemicals or irritants in the air.  A fungus. This is rare.  What increases the risk?  Having a weak body defense system (immune system).  Doing a lot of swimming or diving.  Using nasal sprays too much.  Smoking.  What are the signs or symptoms?  The main symptoms of this condition are pain and a feeling of pressure around the sinuses. Other symptoms include:  Stuffy nose (congestion). This may make it hard to breathe through your nose.  Runny nose (drainage).  Soreness, swelling, and warmth in the sinuses.  A cough that may get worse at night.  Being unable to smell and taste.  Mucus that collects in the throat or the back of the nose (postnasal drip). This may cause a sore throat or bad breath.  Being very tired (fatigued).  A fever.  How is this diagnosed?  Your symptoms.  Your medical history.  A physical  exam.  Tests to find out if your condition is short-term (acute) or long-term (chronic). Your doctor may:  Check your nose for growths (polyps).  Check your sinuses using a tool that has a light on one end (endoscope).  Check for allergies or germs.  Do imaging tests, such as an MRI or CT scan.  How is this treated?  Treatment for this condition depends on the cause and whether it is short-term or long-term.  If caused by a virus, your symptoms should go away on their own within 10 days. You may be given medicines to relieve symptoms. They include:  Medicines that shrink swollen tissue in the nose.  A spray that treats swelling of the nostrils.  Rinses that help get rid of thick mucus in your nose (nasal saline washes).  Medicines that treat allergies (antihistamines).  Over-the-counter pain relievers.  If caused by bacteria, your doctor may wait to see if you will get better without treatment. You may be given antibiotic medicine if you have:  A very bad infection.  A weak body defense system.  If caused by growths in the nose, surgery may be needed.  Follow these instructions at home:  Medicines  Take, use, or apply over-the-counter and prescription medicines only as told by your doctor. These may include nasal sprays.  If you were prescribed an antibiotic medicine, take it as told by your doctor. Do not stop taking it even if you start to feel better.  Hydrate and humidify    Drink enough water to keep your pee (urine) pale yellow.  Use a cool mist humidifier to keep the humidity level in your home above 50%.  Breathe in steam for 10-15 minutes, 3-4 times a day, or as told by your doctor. You can do this in the bathroom while a hot shower is running.  Try not to spend time in cool or dry air.  Rest  Rest as much as you can.  Sleep with your head raised (elevated).  Make sure you get enough sleep each night.  General instructions    Put a warm, moist washcloth on your face 3-4 times a day, or as often as told by  your doctor.  Use nasal saline washes as often as told by your doctor.  Wash your hands often with soap and water. If you cannot use soap and water, use hand .  Do not smoke. Avoid being around people who are smoking (secondhand smoke).  Keep all follow-up visits.  Contact a doctor if:  You have a fever.  Your symptoms get worse.  Your symptoms do not get better within 10 days.  Get help right away if:  You have a very bad headache.  You cannot stop vomiting.  You have very bad pain or swelling around your face or eyes.  You have trouble seeing.  You feel confused.  Your neck is stiff.  You have trouble breathing.  These symptoms may be an emergency. Get help right away. Call 911.  Do not wait to see if the symptoms will go away.  Do not drive yourself to the hospital.  Summary  A sinus infection is swelling of your sinuses. Sinuses are hollow spaces in the bones around your face.  This condition is caused by tissues in your nose that become inflamed or swollen. This traps germs. These can lead to infection.  If you were prescribed an antibiotic medicine, take it as told by your doctor. Do not stop taking it even if you start to feel better.  Keep all follow-up visits.  This information is not intended to replace advice given to you by your health care provider. Make sure you discuss any questions you have with your health care provider.  Document Revised: 11/22/2022 Document Reviewed: 11/22/2022  [employer] Patient Education © 2024 [employer]

## 2024-05-17 NOTE — PROGRESS NOTES
CHIEF COMPLAINT  Chief Complaint   Patient presents with    Earache    Sinusitis         HPI  Matthew Morris is a 53 y.o. male  presents with complaint of bilateral earache and sinus pressure and pain. The sinus symptoms started 5-6 days ago and then the ear pain. His ears are throbbing. He has taken no over the counter medications for his symptoms.     Review of Systems   Constitutional:  Positive for fatigue. Negative for chills, diaphoresis and fever.   HENT:  Positive for congestion, ear pain, sinus pressure, sinus pain and sore throat. Negative for ear discharge, hearing loss and sneezing.    Respiratory:  Negative for cough.    Gastrointestinal:  Positive for nausea. Negative for diarrhea and vomiting.   Musculoskeletal:  Negative for myalgias.   Neurological:  Positive for headaches.       Past Medical History:   Diagnosis Date    Angina pectoris     Aortic calcification 2008    Arthritis     CAD (coronary artery disease)     NSTEMI 4/2016: cath with 99% RCA s/p 3.5x12mm WENDIE, 30% ostial LCx, normal LM/LAD    Cervical radiculopathy     Chest pain     Coronary artery disease involving native coronary artery of native heart without angina pectoris     HERNÁNDEZ (dyspnea on exertion)     Essential hypertension     Headache     Hyperlipidemia     Hypertension     Joint pain     Joint swelling     Knee pain     Leg swelling     Malaise and fatigue     Moderate mitral regurgitation     by echo 4/2016    Neck pain     Palpitations     Pinched nerve in neck 2016    Precordial pain     SOB (shortness of breath)        Family History   Problem Relation Age of Onset    Heart disease Mother     Heart disease Father        Social History     Socioeconomic History    Marital status:    Tobacco Use    Smoking status: Never     Passive exposure: Never    Smokeless tobacco: Never   Vaping Use    Vaping status: Never Used   Substance and Sexual Activity    Alcohol use: No     Alcohol/week: 0.0 standard drinks of  alcohol     Comment: Daily caffeine use    Drug use: Never    Sexual activity: Defer         Pulse 57   Temp 97.3 °F (36.3 °C)     PHYSICAL EXAM  Physical Exam   Constitutional: He is oriented to person, place, and time. He appears well-developed and well-nourished. He does not have a sickly appearance. He does not appear ill. No distress.   Holding his ears and reports throbbing    HENT:   Head: Normocephalic and atraumatic.   Mouth/Throat: Mouth/Lips are normal.Uvula is midline and oropharynx is clear and moist. Mucous membranes are not pale, not dry, not cyanotic and erythematous. No tonsillar exudate. no white patchesnot blistered  Right TM with bulging and erythema. Left TM bulging and no erythema    Eyes: EOM are normal.   Neck: Neck normal appearance.  Pulmonary/Chest: Effort normal.  No respiratory distress.  Neurological: He is alert and oriented to person, place, and time.   Skin: Skin is dry.   Psychiatric: He has a normal mood and affect.         Diagnoses and all orders for this visit:    1. Acute suppurative otitis media of right ear without spontaneous rupture of tympanic membrane, recurrence not specified (Primary)    2. Acute effusion of both middle ears    3. Acute pansinusitis, recurrence not specified    Other orders  -     amoxicillin-clavulanate (AUGMENTIN) 875-125 MG per tablet; Take 1 tablet by mouth 2 (Two) Times a Day for 10 days.  Dispense: 20 tablet; Refill: 0  -     fluticasone (FLONASE) 50 MCG/ACT nasal spray; 2 sprays into the nostril(s) as directed by provider Daily.  Dispense: 16 g; Refill: 0  -     ondansetron ODT (ZOFRAN-ODT) 4 MG disintegrating tablet; Place 1 tablet on the tongue Every 8 (Eight) Hours As Needed for Nausea or Vomiting.  Dispense: 10 tablet; Refill: 0        The use of a video visit has been reviewed with the patient and verbal informed consent has been obtained. Myself and Matthew Morris participated in this visit. The patient is located in [facility]  [place], [place]. I am located in [place], [place]. Mychart and Tyto were utilized.       Note Disclaimer: At [facility], we believe that sharing information builds trust and better   relationships. You are receiving this note because you recently visited [facility]. It is possible you   will see health information before a provider has talked with you about it. This kind of information can   be easy to misunderstand. To help you fully understand what it means for your health, we urge you to   discuss this note with your provider.    Padma Hirsch, CELI  05/17/2024  15:26 EDT

## 2024-07-22 RX ORDER — CLOPIDOGREL BISULFATE 75 MG/1
75 TABLET ORAL DAILY
Qty: 90 TABLET | Refills: 1 | OUTPATIENT
Start: 2024-07-22

## 2024-10-09 NOTE — PROGRESS NOTES
Date of Office Visit: 10/10/2024  Encounter Provider: CELI Mendoza  Place of Service: [facility] CARDIOLOGY  Patient Name: Matthew Morris  :1970    Chief complaint  Transient cardiomyopathy, coronary artery disease, hypertension     History of Present Illness  Patient is a 54 y.o. year old male  patient of Dr. Carson. Past medical history includes who was transferred from [facility] on 2016 after chest pain with an elevated troponin consistent with non-ST elevation myocardial infarction. Echocardiogram revealed an ejection fraction 40% and a catheterization revealed severe disease of the right coronary artery. He received a drug-coated stent. He has had intermittent chest pain since then in the setting of hypertension. He was admitted on  for such an occurrence he ruled out for myocardial infarction. An echocardiogram revealed his ejection fraction had improved to 50% with mild to moderate mitral regurgitation. 2017 he complained of brief palpitations and chest pain that was atypical. Ziopatch showed no arrhythmia. On 2022 with complaints of shortness of breath echo showed an ejection fraction of 62%, normal diastolic function, trivial valve regurgitation, normal right-sided pressures. A stress perfusion study was negative for ischemia though there was GI artifact and there was biventricular enlargement. It was felt to be a low risk study. The patient had attempted to exercise but did not achieve maximal heart rate therefore Lexiscan was administered. He has been diagnosed with latent TB (positive Quantiferon and negative chest xray) and is completing isoniazid and rifampin.  He is being followed by urology for hematuria and hematospermia and has had 2 negative cystoscopies.  There is repeat cystoscopy planned for 2025.    Interval history  Patient presents today for routine follow-up.  I will visit with him today and have  reviewed his medical record.  Since last visit he has had 2 cystoscopies with urology with plans for a third in January.  Thus far these have been unrevealing.  He continues to have microscopic hematuria as well as hematospermia.  This is despite being off aspirin therapy.  He is wearing CPAP consistently and recently had device checked by sleep medicine.  Blood pressure has improved since increasing amlodipine. He had one episode of sharp, left chest and shoulder pain that occurred while mowing grass. He rested and pain resolved spontaneously after less than 10 minutes. He resumed mowing, and pain did not recur. He has not had any pain during his daily walks. He has had a few brief episodes of palpitations that last only a few seconds. He feels this is unchanged. He is walking 1.5 miles daily. [facility] but labs are not available at time of visit.    Past Medical History:   Diagnosis Date    Angina pectoris     Aortic calcification 2008    Arthritis     CAD (coronary artery disease)     NSTEMI 4/2016: cath with 99% RCA s/p 3.5x12mm WENDIE, 30% ostial LCx, normal LM/LAD    Cervical radiculopathy     Chest pain     Coronary artery disease involving native coronary artery of native heart without angina pectoris     HERNÁNDEZ (dyspnea on exertion)     Essential hypertension     Headache     Hyperlipidemia     Hypertension     Joint pain     Joint swelling     Knee pain     Leg swelling     Malaise and fatigue     Moderate mitral regurgitation     by echo 4/2016    Neck pain     Palpitations     Pinched nerve in neck 2016    Precordial pain     SOB (shortness of breath)      Past Surgical History:   Procedure Laterality Date    CARDIAC CATHETERIZATION Left 4/28/2016    Procedure: Cardiac catheterization;  Surgeon: Linda Slaughter MD;  Location: [facility] CATH INVASIVE LOCATION;  Service:     CARDIAC CATHETERIZATION N/A 4/28/2016    Procedure: Left ventriculography;  Surgeon: Linda Slaughter MD;  Location: [facility] CATH  INVASIVE LOCATION;  Service:     CORONARY ANGIOPLASTY WITH STENT PLACEMENT      CYST REMOVAL  1997    right testicle, benign    HERNIA REPAIR      HYDROCELE EXCISION / REPAIR  1997     Outpatient Medications Prior to Visit   Medication Sig Dispense Refill    amLODIPine (NORVASC) 5 MG tablet Take 1 tablet by mouth Daily. 90 tablet 3    atorvastatin (LIPITOR) 80 MG tablet Take 1 tablet by mouth daily. 90 tablet 3    clopidogrel (PLAVIX) 75 MG tablet Take 1 tablet by mouth Daily. 90 tablet 1    cyclobenzaprine (FLEXERIL) 10 MG tablet Take 1 tablet by mouth As Needed.      finasteride (PROSCAR) 5 MG tablet Take 1 tablet by mouth Daily.      fluticasone (FLONASE) 50 MCG/ACT nasal spray 2 sprays into the nostril(s) as directed by provider Daily. 16 g 0    isoniazid (NYDRAZID) 300 MG tablet Take 1 tablet by mouth 1 (One) Time Per Week.      lisinopril-hydrochlorothiazide (PRINZIDE,ZESTORETIC) 20-12.5 MG per tablet TAKE 2 TABLETS BY MOUTH DAILY 180 tablet 3    metoprolol tartrate (LOPRESSOR) 100 MG tablet Take 1 tablet by mouth 2 (Two) Times a Day. 180 tablet 3    ondansetron ODT (ZOFRAN-ODT) 4 MG disintegrating tablet Place 1 tablet on the tongue Every 8 (Eight) Hours As Needed for Nausea or Vomiting. 10 tablet 0    Rifapentine 150 MG tablet Take 1 tablet by mouth 1 (One) Time Per Week.      vitamin B-6 (pyridoxine) 50 MG tablet Take 1 tablet by mouth 1 (One) Time Per Week.       No facility-administered medications prior to visit.       Allergies as of 10/10/2024    (No Known Allergies)     Social History     Socioeconomic History    Marital status:    Tobacco Use    Smoking status: Never     Passive exposure: Never    Smokeless tobacco: Never   Vaping Use    Vaping status: Never Used   Substance and Sexual Activity    Alcohol use: No     Alcohol/week: 0.0 standard drinks of alcohol     Comment: Daily caffeine use    Drug use: Never    Sexual activity: Defer     Family History   Problem Relation Age of Onset     "Heart disease Mother     Heart disease Father      Review of Systems   Constitutional: Positive for malaise/fatigue.   Cardiovascular:  Positive for chest pain and palpitations. Negative for claudication, dyspnea on exertion, leg swelling, near-syncope, orthopnea, paroxysmal nocturnal dyspnea and syncope.   Respiratory:  Negative for shortness of breath.    Neurological:  Negative for brief paralysis, dizziness, headaches and light-headedness.   All other systems reviewed and are negative.       Objective:     Vitals:    10/10/24 1007   BP: 132/82   BP Location: Left arm   Patient Position: Sitting   Cuff Size: Large Adult   Pulse: 55   Weight: 107 kg (236 lb 12.8 oz)   Height: 167.6 cm (66\")     Body mass index is 38.22 kg/m².    Vitals reviewed.   Constitutional:       General: Not in acute distress.     Appearance: Well-developed and not in distress. Not diaphoretic.   HENT:      Head: Normocephalic.   Pulmonary:      Effort: Pulmonary effort is normal. No respiratory distress.      Breath sounds: Normal breath sounds. No wheezing. No rhonchi. No rales.   Cardiovascular:      Normal rate. Regular rhythm.      Murmurs: There is no murmur.   Pulses:     Radial: 2+ bilaterally.  Edema:     Peripheral edema absent.   Skin:     General: Skin is warm and dry. There is no cyanosis.      Findings: No rash.   Neurological:      Mental Status: Alert and oriented to person, place, and time.   Psychiatric:         Behavior: Behavior normal. Behavior is cooperative.         Thought Content: Thought content normal.         Judgment: Judgment normal.       Lab Review:     Lab Results   Component Value Date     04/19/2021     08/31/2020    K 4.4 04/19/2021    K 3.8 08/31/2020     04/19/2021     08/31/2020    CO2 30 04/19/2021    CO2 29 08/31/2020    BUN 10 04/19/2021    BUN 14 08/31/2020    CREATININE 0.90 04/19/2021    CREATININE 1.22 (H) 08/31/2020    EGFRIFAFRI 91 06/03/2016    EGFRIFAFRI 92 04/29/2016 " "   GLUCOSE 113 (H) 04/19/2021    GLUCOSE 106 (H) 08/31/2020    CALCIUM 9.8 04/19/2021    CALCIUM 10.1 08/31/2020    ALBUMIN 4.1 04/19/2021    ALBUMIN 4.0 08/31/2020    BILITOT 0.64 04/19/2021    BILITOT 0.37 08/31/2020    AST 20 04/19/2021    AST 20 08/31/2020    ALT 30 04/19/2021    ALT 28 08/31/2020     Lab Results   Component Value Date    WBC 6.13 04/19/2021    WBC 5.10 08/30/2020    HGB 13.6 (L) 04/19/2021    HGB 14.8 08/30/2020    HCT 42.5 04/19/2021    HCT 46.3 08/30/2020    MCV 84.7 04/19/2021    MCV 85.4 08/30/2020     04/19/2021     08/30/2020     No results found for: \"PROBNP\", \"BNP\"  Lab Results   Component Value Date    CKTOTAL 209 08/30/2020    TROPONINT <0.01 08/31/2020     No results found for: \"TSH\"          ECG 12 Lead    Date/Time: 10/10/2024 12:39 PM  Performed by: Elaina Cruz APRN    Authorized by: Elaina Cruz APRN  Comparison: compared with previous ECG   Similar to previous ECG  Rhythm: sinus rhythm  Rate: normal  BPM: 55  QRS axis: normal  Comments: Similar to prior        Assessment:       Diagnosis Plan   1. Coronary artery disease involving native coronary artery of native heart without angina pectoris        2. Essential hypertension        3. Mixed hyperlipidemia        4. History of non-ST elevation myocardial infarction (NSTEMI)        5. EVON (obstructive sleep apnea)        6. Palpitations          Plan:       1   Palpitations.  Very infrequent at this time.  Continue with the current regimen.  2.  Chest pain.  Resolved, with one brief occurrence. He will monitor for this and call if pain recurs.  3.  Edema, not evident at this moment and mild overall  4.  Coronary artery disease, non-ST elevation myocardial infarction, status post WENDIE to RCA in 2016.  Negative stress test and excellent workload in 9/2022.  No anginal symptoms. Off aspirin due to hematuria.  5.  Hypertension.  Overall controlled on current regimen.  6.  Hyperlipidemia, controlled  7.  " Obstructive sleep apnea.  On CPAP therapy.  8.  Hematuria.  Following with urology and now on finasteride. To have repeat cystoscopy in January 2025.      Time Spent: I spent 30 minutes caring for Matthew on this date of service. This time includes time spent by me in the following activities: preparing for the visit, reviewing tests, performing a medically appropriate examination and/or evaluations, counseling and educating the patient/family/caregiver, ordering medications, tests, or procedures, documenting information in the medical record, and independently interpreting results and communicating that information with the patient/family/caregiver.   I spent 1 minutes on the separately reported service of ECG. This time is not included in the time used to support the E/M service also reported today.        Your medication list            Accurate as of October 10, 2024 12:43 PM. If you have any questions, ask your nurse or doctor.                CONTINUE taking these medications        Instructions Last Dose Given Next Dose Due   amLODIPine 5 MG tablet  Commonly known as: NORVASC      Take 1 tablet by mouth Daily.       atorvastatin 80 MG tablet  Commonly known as: LIPITOR      Take 1 tablet by mouth daily.       clopidogrel 75 MG tablet  Commonly known as: PLAVIX      Take 1 tablet by mouth Daily.       cyclobenzaprine 10 MG tablet  Commonly known as: FLEXERIL      Take 1 tablet by mouth As Needed.       finasteride 5 MG tablet  Commonly known as: PROSCAR      Take 1 tablet by mouth Daily.       fluticasone 50 MCG/ACT nasal spray  Commonly known as: FLONASE      2 sprays into the nostril(s) as directed by provider Daily.       isoniazid 300 MG tablet  Commonly known as: NYDRAZID      Take 1 tablet by mouth 1 (One) Time Per Week.       lisinopril-hydrochlorothiazide 20-12.5 MG per tablet  Commonly known as: PRINZIDE,ZESTORETIC      TAKE 2 TABLETS BY MOUTH DAILY       metoprolol tartrate 100 MG tablet  Commonly  known as: LOPRESSOR      Take 1 tablet by mouth 2 (Two) Times a Day.       ondansetron ODT 4 MG disintegrating tablet  Commonly known as: ZOFRAN-ODT      Place 1 tablet on the tongue Every 8 (Eight) Hours As Needed for Nausea or Vomiting.       Rifapentine 150 MG tablet      Take 1 tablet by mouth 1 (One) Time Per Week.       vitamin B-6 50 MG tablet  Commonly known as: PYRIDOXINE      Take 1 tablet by mouth 1 (One) Time Per Week.                Patient is no longer taking -.  I corrected the med list to reflect this.  I did not stop these medications.    Return in about 6 months (around 4/10/2025) for with Dr. Carson.      Dictated utilizing Dragon dictation

## 2024-10-10 ENCOUNTER — OFFICE VISIT (OUTPATIENT)
Dept: CARDIOLOGY | Facility: CLINIC | Age: 54
End: 2024-10-10
Payer: OTHER GOVERNMENT

## 2024-10-10 VITALS
WEIGHT: 236.8 LBS | BODY MASS INDEX: 38.06 KG/M2 | HEIGHT: 66 IN | DIASTOLIC BLOOD PRESSURE: 82 MMHG | SYSTOLIC BLOOD PRESSURE: 132 MMHG | HEART RATE: 55 BPM

## 2024-10-10 DIAGNOSIS — I25.10 CORONARY ARTERY DISEASE INVOLVING NATIVE CORONARY ARTERY OF NATIVE HEART WITHOUT ANGINA PECTORIS: Primary | ICD-10-CM

## 2024-10-10 DIAGNOSIS — I10 ESSENTIAL HYPERTENSION: ICD-10-CM

## 2024-10-10 DIAGNOSIS — E78.2 MIXED HYPERLIPIDEMIA: ICD-10-CM

## 2024-10-10 DIAGNOSIS — R00.2 PALPITATIONS: ICD-10-CM

## 2024-10-10 DIAGNOSIS — I25.2 HISTORY OF NON-ST ELEVATION MYOCARDIAL INFARCTION (NSTEMI): ICD-10-CM

## 2024-10-10 DIAGNOSIS — G47.33 OSA (OBSTRUCTIVE SLEEP APNEA): ICD-10-CM

## 2024-10-10 PROCEDURE — 93000 ELECTROCARDIOGRAM COMPLETE: CPT | Performed by: NURSE PRACTITIONER

## 2024-10-10 PROCEDURE — 99214 OFFICE O/P EST MOD 30 MIN: CPT | Performed by: NURSE PRACTITIONER

## 2024-10-10 RX ORDER — ISONIAZID 300 MG/1
300 TABLET ORAL WEEKLY
COMMUNITY
Start: 2024-08-19

## 2024-10-10 RX ORDER — LANOLIN ALCOHOL/MO/W.PET/CERES
50 CREAM (GRAM) TOPICAL WEEKLY
COMMUNITY
Start: 2024-08-19

## 2024-10-10 RX ORDER — FINASTERIDE 5 MG/1
5 TABLET, FILM COATED ORAL DAILY
COMMUNITY
Start: 2024-08-19

## 2025-01-26 DIAGNOSIS — I10 ESSENTIAL HYPERTENSION: ICD-10-CM

## 2025-01-27 RX ORDER — AMLODIPINE BESYLATE 5 MG/1
5 TABLET ORAL DAILY
Qty: 90 TABLET | Refills: 3 | Status: SHIPPED | OUTPATIENT
Start: 2025-01-27

## 2025-01-27 NOTE — TELEPHONE ENCOUNTER
Waylon 10/10/24 SW   Nxt 4/25/25 MKD   Labs 10/10/24      Return in about 6 months (around 4/10/2025) for with Dr. Carson.

## 2025-03-17 ENCOUNTER — TELEMEDICINE (OUTPATIENT)
Dept: FAMILY MEDICINE CLINIC | Facility: TELEHEALTH | Age: 55
End: 2025-03-17
Payer: OTHER GOVERNMENT

## 2025-03-17 DIAGNOSIS — J01.00 ACUTE NON-RECURRENT MAXILLARY SINUSITIS: Primary | ICD-10-CM

## 2025-03-17 RX ORDER — METHYLPREDNISOLONE 4 MG/1
TABLET ORAL
Qty: 1 EACH | Refills: 0 | Status: SHIPPED | OUTPATIENT
Start: 2025-03-17

## 2025-03-17 NOTE — PROGRESS NOTES
Subjective   Matthew Morris is a 54 y.o. male.     History of Present Illness  He presents with c/o sinus congestion and sinus headaches that is worse in the evenings. He has had symptoms for 4 days. He did rake up leaves in his yard prior to symptoms starting and he did have allergy symptoms. He has taken benadryl, flonase. He is a  and there have been several children sick. No history of sinus problems.        The following portions of the patient's history were reviewed and updated as appropriate: allergies, current medications, past family history, past medical history, past social history, past surgical history, and problem list.    Review of Systems   Constitutional:  Negative for fever.   HENT:  Positive for congestion, nosebleeds (scant), postnasal drip, rhinorrhea and sinus pressure.    Gastrointestinal:  Positive for diarrhea (once). Negative for nausea and vomiting.   Musculoskeletal:  Negative for myalgias.   Neurological:  Positive for headache.       Objective   Physical Exam  Constitutional:       General: He is not in acute distress.     Appearance: He is well-developed. He is not diaphoretic.   HENT:      Nose:      Right Sinus: Maxillary sinus tenderness present.      Left Sinus: Maxillary sinus tenderness present.   Pulmonary:      Effort: Pulmonary effort is normal.   Neurological:      Mental Status: He is alert and oriented to person, place, and time.   Psychiatric:         Behavior: Behavior normal.           Assessment & Plan   Diagnoses and all orders for this visit:    1. Acute non-recurrent maxillary sinusitis (Primary)  -     amoxicillin-clavulanate (AUGMENTIN) 875-125 MG per tablet; Take 1 tablet by mouth 2 (Two) Times a Day for 10 days.  Dispense: 20 tablet; Refill: 0  -     methylPREDNISolone (MEDROL) 4 MG dose pack; Take as directed on package instructions.  Dispense: 1 each; Refill: 0                 The use of a video visit has been reviewed with the patient  and verbal informed consent has been obtained. Myself and Matthew Morris participated in this visit. The patient is located in [place], [place]. I am located in [place], [place]. Tomveyi Bidamon and [employer] were utilized. I spent 24 minutes in the patient's chart for this visit.

## 2025-03-25 RX ORDER — METOPROLOL TARTRATE 100 MG/1
100 TABLET ORAL 2 TIMES DAILY
Qty: 180 TABLET | Refills: 3 | Status: SHIPPED | OUTPATIENT
Start: 2025-03-25

## 2025-03-25 NOTE — TELEPHONE ENCOUNTER
Waylon 10/10/24 sw   Nxt 4/25/25 mkd   Labs 9/17/24     Return in about 6 months (around 4/10/2025) for with Dr. Carson.

## 2025-04-07 RX ORDER — LISINOPRIL AND HYDROCHLOROTHIAZIDE 12.5; 2 MG/1; MG/1
2 TABLET ORAL DAILY
Qty: 180 TABLET | Refills: 3 | Status: SHIPPED | OUTPATIENT
Start: 2025-04-07

## 2025-04-07 NOTE — TELEPHONE ENCOUNTER
Lv 10/10/24 SW  Nxt 4/25/25 MKD     Labs 9/17/24 EGFR 76  REQUESTED MOST RECENT [facility] FROM [facility]    Return in about 6 months (around 4/10/2025) for with Dr. Carson.

## 2025-06-30 ENCOUNTER — E-VISIT (OUTPATIENT)
Dept: ADMINISTRATIVE | Facility: OTHER | Age: 55
End: 2025-06-30
Payer: OTHER GOVERNMENT

## 2025-06-30 NOTE — E-VISIT ESCALATED
Status: Referred Out  Date: 2025 17:09:17  Acuity Level: Not applicable  Referral message:  A visit is not appropriate for you because you indicated you are experiencing an emergency. For the most appropriate care, call 911 or go to an emergency room and be seen immediately.  If you have thoughts of harming yourself or   others, help from a trained counselor is available 24 hours a day to talk to you and provide help through the [employer] Suicide and Crisis Lifeline. Call, text, or chat with a trained counselor by dialing [employer] or [phone]) [phone]. For more information on the [employer]   Suicide and Crisis Lifeline, visit: [employer]org.   Patient: Matthew Morris  Patient : 1970  Patient Address: [address] [place] [address]  Patient Phone: [phone]  Clinician Response: Unavailable  Diagnosis: Unavailable  Diagnosis ICD: Unavailable     Patient Interview Questions and Responses: None available

## 2025-07-01 ENCOUNTER — TELEMEDICINE (OUTPATIENT)
Dept: FAMILY MEDICINE CLINIC | Facility: TELEHEALTH | Age: 55
End: 2025-07-01
Payer: OTHER GOVERNMENT

## 2025-07-01 DIAGNOSIS — R06.02 SHORTNESS OF BREATH: ICD-10-CM

## 2025-07-01 DIAGNOSIS — J06.9 UPPER RESPIRATORY TRACT INFECTION, UNSPECIFIED TYPE: Primary | ICD-10-CM

## 2025-07-01 RX ORDER — ALBUTEROL SULFATE 90 UG/1
2 INHALANT RESPIRATORY (INHALATION) EVERY 4 HOURS PRN
Qty: 25.5 G | Refills: 0 | Status: SHIPPED | OUTPATIENT
Start: 2025-07-01

## 2025-07-01 RX ORDER — PREDNISONE 10 MG/1
TABLET ORAL DAILY
Qty: 21 EACH | Refills: 0 | Status: SHIPPED | OUTPATIENT
Start: 2025-07-01 | End: 2025-07-07

## 2025-07-01 RX ORDER — LORATADINE 10 MG
1 CAPSULE ORAL EVERY 8 HOURS PRN
Qty: 24 EACH | Refills: 0 | Status: SHIPPED | OUTPATIENT
Start: 2025-07-01

## 2025-07-01 NOTE — PROGRESS NOTES
You have chosen to receive care through a telehealth visit.  Do you consent to use a video/audio connection for your medical care today? Yes     HPI  History of Present Illness  The patient presents via virtual visit for evaluation of sinus headache, pressure, coughing, tightness causing some shortness of breath, and a low-grade fever.    He sought medical attention at an urgent care facility on Saturday, where he tested negative for COVID-19 and influenza. Despite being advised to manage his symptoms with over-the-counter medications, his condition has deteriorated. He experienced severe difficulty breathing and excessive coughing yesterday, which have slightly improved today. He reports no known exposure to sick individuals. His symptoms began on 06/26/2025, and he waited three days before seeking medical attention. He recalls being outdoors cutting grass without a mask, which he usually wears, and suspects he may have inhaled some grass. He reports no green or yellow nasal discharge but does feel postnasal drainage. He is able to sleep. He has been using Flonase, which he finds helpful, and took it this morning. He has been taking Tylenol for his headaches and trying to relax in well-lit areas. He has also taken over-the-counter Mucinex for his cough, but it has not been effective.    He experienced diarrhea this morning, which was his first episode. He has been trying to stay hydrated.    He has a history of coronary artery disease and has had a heart attack. He is on Norvasc, Lipitor, Plavix, Flexeril, finasteride, Flonase, lisinopril, Lopressor, Zofran as needed for nausea, and vitamin D.    He has high blood pressure, which is currently under good control. He tries to avoid things or foods that could cause it to rise.    He has sleep apnea and uses a CPAP mask.    He has asthma. He was previously prescribed a Medrol Dosepak and albuterol inhaler for shortness of breath about six months ago, which were  beneficial. However, he has run out of the albuterol inhaler.    Review of Systems: See HPI    Past Medical History:   Diagnosis Date    Angina pectoris     Aortic calcification 2008    Arthritis     CAD (coronary artery disease)     NSTEMI 4/2016: cath with 99% RCA s/p 3.5x12mm WENDIE, 30% ostial LCx, normal LM/LAD    Cervical radiculopathy     Chest pain     Coronary artery disease involving native coronary artery of native heart without angina pectoris     HERNÁNDEZ (dyspnea on exertion)     Essential hypertension     Headache     Hyperlipidemia     Hypertension     Joint pain     Joint swelling     Knee pain     Leg swelling     Malaise and fatigue     Moderate mitral regurgitation     by echo 4/2016    Neck pain     Palpitations     Pinched nerve in neck 2016    Precordial pain     SOB (shortness of breath)        Family History   Problem Relation Age of Onset    Heart disease Mother     Heart disease Father        Social History     Socioeconomic History    Marital status:    Tobacco Use    Smoking status: Never     Passive exposure: Never    Smokeless tobacco: Never   Vaping Use    Vaping status: Never Used   Substance and Sexual Activity    Alcohol use: No     Alcohol/week: 0.0 standard drinks of alcohol     Comment: Daily caffeine use    Drug use: Never    Sexual activity: Defer         There were no vitals taken for this visit.    PHYSICAL EXAM  Physical Exam   Constitutional: He is oriented to person, place, and time. He appears well-developed and well-nourished. He does not have a sickly appearance. He does not appear ill. No distress.   HENT:   Head: Normocephalic and atraumatic.   Nose: Congestion present. Right sinus exhibits no maxillary sinus tenderness and no frontal sinus tenderness. Left sinus exhibits no maxillary sinus tenderness and no frontal sinus tenderness.   Mouth/Throat: Mouth/Lips are normal.  Pulmonary/Chest: Effort normal.  No respiratory distress. He no audible wheeze...  Neurological:  He is alert and oriented to person, place, and time.   Psychiatric: He has a normal mood and affect.   Vitals reviewed.      Diagnoses and all orders for this visit:    1. Upper respiratory tract infection, unspecified type (Primary)  -     DM-GG & DM-APAP-CPM (Coricidin HBP Day/Night Cold) 10-20 &-2 MG misc; Take 1 tablet by mouth Every 8 (Eight) Hours As Needed (cold and cough).  Dispense: 24 each; Refill: 0    2. Shortness of breath  -     albuterol sulfate  (90 Base) MCG/ACT inhaler; Inhale 2 puffs Every 4 (Four) Hours As Needed for Wheezing.  Dispense: 25.5 g; Refill: 0  -     predniSONE (DELTASONE) 10 MG (21) dose pack; Take  by mouth Daily for 6 days. Call [phone] for questions Tierney Guerra  Dispense: 21 each; Refill: 0      Assessment & Plan  1. Viral infection.  - Symptoms include sinus headache, pressure, coughing, tightness causing shortness of breath, low-grade fever, and diarrhea.  - Physical exam findings and test results from urgent care indicate no COVID-19, flu, or other infections.  - Discussion includes the possibility of a viral infection, which typically resolves within 7 to 10 days. Shortness of breath may be due to asthma exacerbation.  - continue Mucinex  1 tablet twice daily with a full glass of water, albuterol refill, steroid pack to be taken in the morning with breakfast, Coricidin HBP for cold symptoms, and Tylenol for pain management. Advised to maintain adequate hydration and update via [employer] by Thursday if no improvement, at which point an antibiotic prescription will be considered.    2. Diarrhea.  - Diarrhea started this morning and may be unrelated to the viral infection.  - No specific physical exam findings or test results discussed.  - Discussion includes the possibility of diarrhea being caused by something he ate or postnasal drainage.  - Advised to monitor symptoms and maintain hydration.    3. Coronary artery disease.  - History of coronary artery  disease and previous heart attack.  - Current medications include Norvasc, Lipitor, Plavix, Flexeril, finasteride, Flonase, lisinopril, Lopressor, Zofran as needed for nausea, and vitamin D.  - Reviewed medication regimen and confirmed adherence.  - No changes to current treatment plan.    4. Hypertension.  - Blood pressure is currently under good control.  - No specific physical exam findings or test results discussed.  - Discussion includes the importance of avoiding activities or foods that could cause blood pressure to spike.  - Advised to continue current medication regimen.      FOLLOW-UP  As discussed during visit with [facility], if symptoms worsen or fail to improve, follow-up with PCP/Urgent Care/Emergency Department.    Patient verbalizes understanding of medications, instructions for treatment and follow-up.    Patient or patient representative verbalized consent for the use of Ambient Listening during the visit with  CELI Harris for chart documentation. 7/1/2025  11:11 EDT    CELI Harris  07/01/2025  11:11 EDT    The use of a video visit has been reviewed with the patient and verbal informed consent has been obtained. Myself and Matthew Morris participated in this visit. The patient is located in [place], and I am located in [place], [place]. [employer]  were utilized.

## 2025-07-03 RX ORDER — AMLODIPINE BESYLATE 2.5 MG/1
2.5 TABLET ORAL DAILY
Qty: 90 TABLET | Refills: 0 | Status: SHIPPED | OUTPATIENT
Start: 2025-07-03